# Patient Record
Sex: MALE | Race: BLACK OR AFRICAN AMERICAN | NOT HISPANIC OR LATINO | Employment: UNEMPLOYED | URBAN - METROPOLITAN AREA
[De-identification: names, ages, dates, MRNs, and addresses within clinical notes are randomized per-mention and may not be internally consistent; named-entity substitution may affect disease eponyms.]

---

## 2017-05-04 ENCOUNTER — HOSPITAL ENCOUNTER (EMERGENCY)
Facility: HOSPITAL | Age: 47
Discharge: PSYCHIATRIC OR SUBSTANCE USE FACILITY | End: 2017-05-05
Attending: EMERGENCY MEDICINE

## 2017-05-04 DIAGNOSIS — F29 PSYCHOSIS, UNSPECIFIED PSYCHOSIS TYPE: ICD-10-CM

## 2017-05-04 DIAGNOSIS — R45.851 SUICIDAL IDEATION: Primary | ICD-10-CM

## 2017-05-04 LAB
ALBUMIN SERPL BCP-MCNC: 4.2 G/DL
ALP SERPL-CCNC: 54 U/L
ALT SERPL W/O P-5'-P-CCNC: 39 U/L
ANION GAP SERPL CALC-SCNC: 15 MMOL/L
APAP SERPL-MCNC: <3 UG/ML
AST SERPL-CCNC: 31 U/L
BASOPHILS # BLD AUTO: 0.03 K/UL
BASOPHILS NFR BLD: 0.3 %
BILIRUB SERPL-MCNC: 0.5 MG/DL
BUN SERPL-MCNC: 10 MG/DL
CALCIUM SERPL-MCNC: 10 MG/DL
CHLORIDE SERPL-SCNC: 103 MMOL/L
CO2 SERPL-SCNC: 22 MMOL/L
CREAT SERPL-MCNC: 1.1 MG/DL
DIFFERENTIAL METHOD: ABNORMAL
EOSINOPHIL # BLD AUTO: 0.1 K/UL
EOSINOPHIL NFR BLD: 0.7 %
ERYTHROCYTE [DISTWIDTH] IN BLOOD BY AUTOMATED COUNT: 17.3 %
EST. GFR  (AFRICAN AMERICAN): >60 ML/MIN/1.73 M^2
EST. GFR  (NON AFRICAN AMERICAN): >60 ML/MIN/1.73 M^2
ETHANOL SERPL-MCNC: <10 MG/DL
GLUCOSE SERPL-MCNC: 104 MG/DL
HCT VFR BLD AUTO: 42.2 %
HGB BLD-MCNC: 13.4 G/DL
LYMPHOCYTES # BLD AUTO: 2 K/UL
LYMPHOCYTES NFR BLD: 19.1 %
MCH RBC QN AUTO: 24.1 PG
MCHC RBC AUTO-ENTMCNC: 31.8 %
MCV RBC AUTO: 76 FL
MONOCYTES # BLD AUTO: 0.8 K/UL
MONOCYTES NFR BLD: 7.7 %
NEUTROPHILS # BLD AUTO: 7.4 K/UL
NEUTROPHILS NFR BLD: 72 %
PLATELET # BLD AUTO: 318 K/UL
PMV BLD AUTO: 10.1 FL
POTASSIUM SERPL-SCNC: 3.7 MMOL/L
PROT SERPL-MCNC: 9.4 G/DL
RBC # BLD AUTO: 5.55 M/UL
SODIUM SERPL-SCNC: 140 MMOL/L
T4 FREE SERPL-MCNC: 1.27 NG/DL
TSH SERPL DL<=0.005 MIU/L-ACNC: 4.64 UIU/ML
WBC # BLD AUTO: 10.23 K/UL

## 2017-05-04 PROCEDURE — 93005 ELECTROCARDIOGRAM TRACING: CPT

## 2017-05-04 PROCEDURE — 80320 DRUG SCREEN QUANTALCOHOLS: CPT

## 2017-05-04 PROCEDURE — 81000 URINALYSIS NONAUTO W/SCOPE: CPT

## 2017-05-04 PROCEDURE — 87086 URINE CULTURE/COLONY COUNT: CPT

## 2017-05-04 PROCEDURE — 96372 THER/PROPH/DIAG INJ SC/IM: CPT

## 2017-05-04 PROCEDURE — 80053 COMPREHEN METABOLIC PANEL: CPT

## 2017-05-04 PROCEDURE — 84443 ASSAY THYROID STIM HORMONE: CPT

## 2017-05-04 PROCEDURE — 80329 ANALGESICS NON-OPIOID 1 OR 2: CPT

## 2017-05-04 PROCEDURE — 84439 ASSAY OF FREE THYROXINE: CPT

## 2017-05-04 PROCEDURE — 85025 COMPLETE CBC W/AUTO DIFF WBC: CPT

## 2017-05-04 PROCEDURE — 82570 ASSAY OF URINE CREATININE: CPT

## 2017-05-04 PROCEDURE — 99285 EMERGENCY DEPT VISIT HI MDM: CPT | Mod: 25

## 2017-05-04 PROCEDURE — 63600175 PHARM REV CODE 636 W HCPCS: Performed by: EMERGENCY MEDICINE

## 2017-05-04 RX ORDER — DIPHENHYDRAMINE HYDROCHLORIDE 50 MG/ML
25 INJECTION INTRAMUSCULAR; INTRAVENOUS
Status: COMPLETED | OUTPATIENT
Start: 2017-05-04 | End: 2017-05-04

## 2017-05-04 RX ORDER — ZIPRASIDONE HYDROCHLORIDE 60 MG/1
20 CAPSULE ORAL 2 TIMES DAILY
Status: ON HOLD | COMMUNITY
End: 2018-05-17 | Stop reason: HOSPADM

## 2017-05-04 RX ORDER — LORAZEPAM 2 MG/ML
2 INJECTION INTRAMUSCULAR
Status: COMPLETED | OUTPATIENT
Start: 2017-05-04 | End: 2017-05-04

## 2017-05-04 RX ORDER — HALOPERIDOL 5 MG/ML
10 INJECTION INTRAMUSCULAR
Status: COMPLETED | OUTPATIENT
Start: 2017-05-04 | End: 2017-05-04

## 2017-05-04 RX ADMIN — DIPHENHYDRAMINE HYDROCHLORIDE 25 MG: 50 INJECTION, SOLUTION INTRAMUSCULAR; INTRAVENOUS at 09:05

## 2017-05-04 RX ADMIN — LORAZEPAM 2 MG: 2 INJECTION INTRAMUSCULAR; INTRAVENOUS at 09:05

## 2017-05-04 RX ADMIN — HALOPERIDOL LACTATE 10 MG: 5 INJECTION, SOLUTION INTRAMUSCULAR at 09:05

## 2017-05-05 VITALS
BODY MASS INDEX: 45.1 KG/M2 | SYSTOLIC BLOOD PRESSURE: 128 MMHG | TEMPERATURE: 98 F | OXYGEN SATURATION: 95 % | WEIGHT: 315 LBS | HEIGHT: 70 IN | HEART RATE: 92 BPM | RESPIRATION RATE: 20 BRPM | DIASTOLIC BLOOD PRESSURE: 60 MMHG

## 2017-05-05 LAB
AMPHET+METHAMPHET UR QL: NEGATIVE
BACTERIA #/AREA URNS HPF: ABNORMAL /HPF
BARBITURATES UR QL SCN>200 NG/ML: NEGATIVE
BENZODIAZ UR QL SCN>200 NG/ML: NEGATIVE
BILIRUB UR QL STRIP: NEGATIVE
BZE UR QL SCN: NEGATIVE
CANNABINOIDS UR QL SCN: NEGATIVE
CLARITY UR: CLEAR
COLOR UR: YELLOW
CREAT UR-MCNC: 293 MG/DL
GLUCOSE UR QL STRIP: NEGATIVE
HGB UR QL STRIP: ABNORMAL
HYALINE CASTS #/AREA URNS LPF: 2 /LPF
KETONES UR QL STRIP: ABNORMAL
LEUKOCYTE ESTERASE UR QL STRIP: ABNORMAL
METHADONE UR QL SCN>300 NG/ML: NEGATIVE
MICROSCOPIC COMMENT: ABNORMAL
NITRITE UR QL STRIP: NEGATIVE
OPIATES UR QL SCN: NEGATIVE
PCP UR QL SCN>25 NG/ML: NEGATIVE
PH UR STRIP: 5 [PH] (ref 5–8)
PROT UR QL STRIP: ABNORMAL
RBC #/AREA URNS HPF: 2 /HPF (ref 0–4)
SP GR UR STRIP: 1.02 (ref 1–1.03)
TOXICOLOGY INFORMATION: NORMAL
URN SPEC COLLECT METH UR: ABNORMAL
UROBILINOGEN UR STRIP-ACNC: NEGATIVE EU/DL
WBC #/AREA URNS HPF: 6 /HPF (ref 0–5)

## 2017-05-05 PROCEDURE — 25000003 PHARM REV CODE 250: Performed by: EMERGENCY MEDICINE

## 2017-05-05 RX ORDER — OLANZAPINE 5 MG/1
10 TABLET, ORALLY DISINTEGRATING ORAL DAILY
Status: DISCONTINUED | OUTPATIENT
Start: 2017-05-05 | End: 2017-05-05 | Stop reason: HOSPADM

## 2017-05-05 RX ADMIN — OLANZAPINE 10 MG: 5 TABLET, ORALLY DISINTEGRATING ORAL at 08:05

## 2017-05-05 NOTE — NURSING
PT'S MOTHER AZEEM WILL BE AT WORK ON 5TH FLOOR ORTHO AT [facility] FOR DAY SHIFT IF INFORMATION IS NEEDED.

## 2017-05-05 NOTE — ED NOTES
Spoke with Talisha from [facility]. She states that they are reviewing the chart, will cjheck with Doc and give us a call back.

## 2017-05-05 NOTE — ED NOTES
Called the coroners office at 20:56 no answer but I did leave a message and I faxed over the PEC at 20:31!!!!!!!!!!!!!!!!!!!!!!

## 2017-05-05 NOTE — ED NOTES
SPD here to transport patient to [place]. Security escorted patient out along side [facility].

## 2017-05-05 NOTE — ED TRIAGE NOTES
"Pt states that he came in because "everybody know their parts in your movie. Money power respect. He does not want anybody to get hurt.Pt states that he does not want to hurt anybody or himself. Denies CP, N/V, SOB, depression or anything else. Pts Mother states that she went to coroners office to sign the papers because he wants to hurt himself. His dad picked him up at about 430 this AM and he said that he just wanted to end it. To shoot himself.  "

## 2017-05-05 NOTE — ED PROVIDER NOTES
Encounter Date: 5/4/2017    SCRIBE #1 NOTE: I, Esperanza Gayle, am scribing for, and in the presence of,  Prakash Pulido MD. I have scribed the following portions of the note - Other sections scribed: HPI, ROS.       History     Chief Complaint   Patient presents with    Psychiatric Evaluation     BIB [facility] W/ PROTECTIVE CUSTODY ORDER SECURED BY MOTHER WHO STATES HE IS SUICIDAL. PT DENIES SI, IS CALM AND COOPERATIVE AT THIS TIME.      Review of patient's allergies indicates:  No Known Allergies  HPI Comments: CC: Suicidal Ideation    HPI: 46 y.o. M with medical hx of schizophrenia and bipolar disorder presents to the ED via EMS with protective custody order secured by mother for an evaluation of suicidal ideation and hallucinations since this morning. Pt denies chest pain, N/V, SOB, and any other associated symptoms. Mother states pt went to his father's house this morning and reports wanting to end his life. Pt has no specific plan.    The history is provided by the patient and a parent. No  was used.     Past Medical History:   Diagnosis Date    Brief reactive psychosis      History reviewed. No pertinent surgical history.  History reviewed. No pertinent family history.  Social History   Substance Use Topics    Smoking status: Former Smoker     Types: Cigarettes     Quit date: 7/20/2012    Smokeless tobacco: None    Alcohol use Yes      Comment: once every other month     Review of Systems   Constitutional: Negative for chills, diaphoresis and fever.   HENT: Negative for ear pain and sore throat.    Eyes: Negative for photophobia and visual disturbance.   Respiratory: Negative for cough and shortness of breath.    Cardiovascular: Negative for chest pain.   Gastrointestinal: Negative for abdominal pain, diarrhea, nausea and vomiting.   Genitourinary: Negative for dysuria.   Musculoskeletal: Negative for back pain.   Skin: Negative for rash.   Neurological: Negative for headaches.    Psychiatric/Behavioral: Positive for hallucinations and suicidal ideas.        (-) homicidal ideation       Physical Exam   Initial Vitals   BP Pulse Resp Temp SpO2   -- -- -- -- --            Physical Exam    Nursing note and vitals reviewed.  Constitutional:   Obese, chronically ill-appearing male   HENT:   Head: Normocephalic and atraumatic.   Eyes: EOM are normal. Pupils are equal, round, and reactive to light.   Cardiovascular: Normal rate, regular rhythm, normal heart sounds and intact distal pulses.   Pulmonary/Chest: Breath sounds normal. No respiratory distress. He has no wheezes. He has no rhonchi. He has no rales. He exhibits no tenderness.   Abdominal: Soft. Bowel sounds are normal. He exhibits no distension. There is no tenderness.   Musculoskeletal: Normal range of motion. He exhibits no edema.   Neurological: He is alert and oriented to person, place, and time.   Skin: Skin is warm and dry.   Psychiatric:   Abnormal thought process noted         ED Course   Procedures  Labs Reviewed   CBC W/ AUTO DIFFERENTIAL - Abnormal; Notable for the following:        Result Value    Hemoglobin 13.4 (*)     MCV 76 (*)     MCH 24.1 (*)     MCHC 31.8 (*)     RDW 17.3 (*)     All other components within normal limits   COMPREHENSIVE METABOLIC PANEL - Abnormal; Notable for the following:     CO2 22 (*)     Total Protein 9.4 (*)     Alkaline Phosphatase 54 (*)     All other components within normal limits   TSH - Abnormal; Notable for the following:     TSH 4.640 (*)     All other components within normal limits   URINALYSIS - Abnormal; Notable for the following:     Protein, UA 1+ (*)     Ketones, UA 1+ (*)     Occult Blood UA 1+ (*)     Leukocytes, UA 1+ (*)     All other components within normal limits   ACETAMINOPHEN LEVEL - Abnormal; Notable for the following:     Acetaminophen (Tylenol), Serum <3.0 (*)     All other components within normal limits   URINALYSIS MICROSCOPIC - Abnormal; Notable for the following:      WBC, UA 6 (*)     Hyaline Casts, UA 2 (*)     All other components within normal limits   CULTURE, URINE   DRUG SCREEN PANEL, URINE EMERGENCY   ALCOHOL,MEDICAL (ETHANOL)   T4, FREE        MEDICAL DECISION MAKING    This is an emergent evaluation of the patient's symptoms.  Differential diagnoses includes: Hyponatremia, drug use, bipolar disorder, schizophrenia, psychosis. Room air pulse oximetry interpreted by me as normal. A decision was made to obtain the patient's old medical records.  If they were available, these records were reviewed.  Significant findings include prior evaluation for bronchitis.  The patient appears actively psychotic and is currently gravely disabled with active hallucinations.  He was sent with [facility]. I will emergently place the patient under PEC. I will obtain labs to medically clear the patient and arrange for transfer to inpatient psychiatric facility.                Scribe Attestation:   Scribe #1: I performed the above scribed service and the documentation accurately describes the services I performed. I attest to the accuracy of the note.    Attending Attestation:           Physician Attestation for Scribe:  Physician Attestation Statement for Scribe #1: I, Prakash Pulido MD, reviewed documentation, as scribed by Esperanza Gayle in my presence, and it is both accurate and complete.                 ED Course     Clinical Impression:   The primary encounter diagnosis was Suicidal ideation. A diagnosis of Psychosis, unspecified psychosis type was also pertinent to this visit.          Prakash Pulido MD  05/05/17 0035

## 2017-05-07 LAB — BACTERIA UR CULT: NORMAL

## 2018-05-08 ENCOUNTER — HOSPITAL ENCOUNTER (INPATIENT)
Facility: HOSPITAL | Age: 48
LOS: 9 days | Discharge: HOME | DRG: 885 | End: 2018-05-17
Attending: PSYCHIATRY & NEUROLOGY | Admitting: PSYCHIATRY & NEUROLOGY
Payer: MEDICAID

## 2018-05-08 DIAGNOSIS — F20.9 SCHIZOPHRENIA, CHRONIC CONDITION: Primary | Chronic | ICD-10-CM

## 2018-05-08 DIAGNOSIS — F20.9 SCHIZOPHRENIA, CHRONIC CONDITION WITH ACUTE EXACERBATION: Chronic | ICD-10-CM

## 2018-05-08 DIAGNOSIS — E66.01 CLASS 3 SEVERE OBESITY DUE TO EXCESS CALORIES WITH SERIOUS COMORBIDITY AND BODY MASS INDEX (BMI) OF 40.0 TO 44.9 IN ADULT: ICD-10-CM

## 2018-05-08 DIAGNOSIS — I10 ESSENTIAL HYPERTENSION: Chronic | ICD-10-CM

## 2018-05-08 DIAGNOSIS — F20.0 SCHIZOPHRENIA, PARANOID: ICD-10-CM

## 2018-05-08 DIAGNOSIS — E11.42 TYPE 2 DIABETES MELLITUS WITH DIABETIC POLYNEUROPATHY, WITHOUT LONG-TERM CURRENT USE OF INSULIN: ICD-10-CM

## 2018-05-08 DIAGNOSIS — N30.00 ACUTE CYSTITIS WITHOUT HEMATURIA: ICD-10-CM

## 2018-05-08 PROBLEM — E66.9 OBESITY: Status: ACTIVE | Noted: 2018-05-08

## 2018-05-08 PROBLEM — E11.49 DIABETES MELLITUS WITH NEUROLOGICAL MANIFESTATIONS: Status: ACTIVE | Noted: 2018-05-08

## 2018-05-08 PROCEDURE — 99222 1ST HOSP IP/OBS MODERATE 55: CPT | Mod: AF,HB,, | Performed by: PSYCHIATRY & NEUROLOGY

## 2018-05-08 PROCEDURE — 12400001 HC PSYCH SEMI-PRIVATE ROOM

## 2018-05-08 RX ORDER — DIPHENHYDRAMINE HCL 50 MG
50 CAPSULE ORAL EVERY 4 HOURS PRN
Status: CANCELLED | OUTPATIENT
Start: 2018-05-08

## 2018-05-08 RX ORDER — LORAZEPAM 1 MG/1
2 TABLET ORAL EVERY 4 HOURS PRN
Status: DISCONTINUED | OUTPATIENT
Start: 2018-05-08 | End: 2018-05-17 | Stop reason: HOSPADM

## 2018-05-08 RX ORDER — HALOPERIDOL 5 MG/ML
5 INJECTION INTRAMUSCULAR EVERY 4 HOURS PRN
Status: DISCONTINUED | OUTPATIENT
Start: 2018-05-08 | End: 2018-05-17 | Stop reason: HOSPADM

## 2018-05-08 RX ORDER — CIPROFLOXACIN 500 MG/1
500 TABLET ORAL EVERY 12 HOURS
Status: DISCONTINUED | OUTPATIENT
Start: 2018-05-08 | End: 2018-05-15

## 2018-05-08 RX ORDER — DIPHENHYDRAMINE HCL 50 MG
50 CAPSULE ORAL EVERY 4 HOURS PRN
Status: DISCONTINUED | OUTPATIENT
Start: 2018-05-08 | End: 2018-05-17 | Stop reason: HOSPADM

## 2018-05-08 RX ORDER — DOCUSATE SODIUM 100 MG/1
100 CAPSULE, LIQUID FILLED ORAL DAILY PRN
Status: DISCONTINUED | OUTPATIENT
Start: 2018-05-08 | End: 2018-05-17 | Stop reason: HOSPADM

## 2018-05-08 RX ORDER — HALOPERIDOL 5 MG/1
5 TABLET ORAL EVERY 4 HOURS PRN
Status: DISCONTINUED | OUTPATIENT
Start: 2018-05-08 | End: 2018-05-17 | Stop reason: HOSPADM

## 2018-05-08 RX ORDER — IBUPROFEN 200 MG
24 TABLET ORAL
Status: CANCELLED | OUTPATIENT
Start: 2018-05-08

## 2018-05-08 RX ORDER — LOPERAMIDE HYDROCHLORIDE 2 MG/1
2 CAPSULE ORAL
Status: DISCONTINUED | OUTPATIENT
Start: 2018-05-08 | End: 2018-05-17 | Stop reason: HOSPADM

## 2018-05-08 RX ORDER — HALOPERIDOL 5 MG/1
5 TABLET ORAL 2 TIMES DAILY
Status: CANCELLED | OUTPATIENT
Start: 2018-05-08

## 2018-05-08 RX ORDER — HALOPERIDOL 5 MG/ML
5 INJECTION INTRAMUSCULAR EVERY 4 HOURS PRN
Status: CANCELLED | OUTPATIENT
Start: 2018-05-08

## 2018-05-08 RX ORDER — GLUCAGON 1 MG
1 KIT INJECTION
Status: CANCELLED | OUTPATIENT
Start: 2018-05-08

## 2018-05-08 RX ORDER — FOLIC ACID 1 MG/1
1 TABLET ORAL DAILY
Status: DISCONTINUED | OUTPATIENT
Start: 2018-05-09 | End: 2018-05-17 | Stop reason: HOSPADM

## 2018-05-08 RX ORDER — IBUPROFEN 200 MG
16 TABLET ORAL
Status: CANCELLED | OUTPATIENT
Start: 2018-05-08

## 2018-05-08 RX ORDER — CALCIUM CARBONATE 200(500)MG
1000 TABLET,CHEWABLE ORAL EVERY 8 HOURS PRN
Status: DISCONTINUED | OUTPATIENT
Start: 2018-05-08 | End: 2018-05-17 | Stop reason: HOSPADM

## 2018-05-08 RX ORDER — DIPHENHYDRAMINE HYDROCHLORIDE 50 MG/ML
50 INJECTION INTRAMUSCULAR; INTRAVENOUS EVERY 4 HOURS PRN
Status: DISCONTINUED | OUTPATIENT
Start: 2018-05-08 | End: 2018-05-17 | Stop reason: HOSPADM

## 2018-05-08 RX ORDER — LORAZEPAM 1 MG/1
2 TABLET ORAL EVERY 4 HOURS PRN
Status: CANCELLED | OUTPATIENT
Start: 2018-05-08

## 2018-05-08 RX ORDER — HYDROXYZINE PAMOATE 50 MG/1
50 CAPSULE ORAL NIGHTLY PRN
Status: DISCONTINUED | OUTPATIENT
Start: 2018-05-08 | End: 2018-05-09

## 2018-05-08 RX ORDER — DIPHENHYDRAMINE HYDROCHLORIDE 50 MG/ML
50 INJECTION INTRAMUSCULAR; INTRAVENOUS EVERY 4 HOURS PRN
Status: CANCELLED | OUTPATIENT
Start: 2018-05-08

## 2018-05-08 RX ORDER — HALOPERIDOL 5 MG/1
5 TABLET ORAL EVERY 4 HOURS PRN
Status: CANCELLED | OUTPATIENT
Start: 2018-05-08

## 2018-05-08 RX ORDER — DOXYCYCLINE HYCLATE 100 MG
100 TABLET ORAL EVERY 12 HOURS
Status: CANCELLED | OUTPATIENT
Start: 2018-05-08 | End: 2018-05-15

## 2018-05-08 RX ORDER — DIPHENHYDRAMINE HCL 50 MG
50 CAPSULE ORAL NIGHTLY
Status: CANCELLED | OUTPATIENT
Start: 2018-05-08

## 2018-05-08 NOTE — ASSESSMENT & PLAN NOTE
"Patient acutely psychotic, with hx of schizophrenia with current and past paranoid delusions and disorganized behavior. Bipolar disorder with psychotic lion also within differential. Further history will need to be obtained to clarify diagnosis.  -maintain PEC and admit involuntarily to psychiatry due to grave disability, and threat to self and others (agitation, voicing SI and vague threats "to take with me" other people).   -for now start haldol 5 mg bid due to improvement with haldol IM given in ED. Obtain collateral to get other med trials.  -benadryl 50 mg qhs for insomnia/prevention of EPS.     "

## 2018-05-08 NOTE — H&P
[facility]  Psychiatry  H&P    Patient Name: Adriano Villar Jr.  MRN: [id number]   Code Status: No Order  Admission Date: 5/8/18  Hospital Length of Stay: 0 days  Expected Discharge Date:   Attending Physician: Adriano Ho MD  Primary Care Provider: Primary Doctor No    Current Legal Status: [facility]    Patient information was obtained from patient, past medical records and ER records.     Subjective:     Principal Problem:Schizophrenia    Chief Complaint: Paranoia, bizarre behavior, suicidal ideation.     HPI:48 yo unemployed man residing with [facility] with past psychiatric hx of schizophrenia and bipolar diagnoses, multiple past psychiatric hospitalizations brought in by [facility] unit for bizarre behavior and paranoid delusions in setting of med noncompliance.     Patient denies psychiatric symptoms. Per family patient has been off psychiatric mediations for the past year. Recently has expressed fear that people are watching him or attempting to poison him. Has not been sleeping. Was seen by a psychiatrist yesterday who prescribed him latuda, which patient has not started yet. Was brought in by family to [facility] ED last night dueto paranoid behavior and not eating. At the time he was not felt to be an acute threat to himself or others and was discharged. Mobile Crisis Was called today after patient was running down [address] naked and was reporting bugs were crawling inside of him. Was uncooperative and agitated in the ED, became calm following intramuscular haldol 5 mg, ativan 2 mg and benadryl 50 mg.     Patient refused to speak to this writer, was lying on stretcher, would open eyes and turn when spoken to but did not respond to any questions.     Past psych hx: past psychiatric hospitalizations, prior diagnoses of bipaolr disorder and schizophrenia. Prior med trials unknown.    Past medical hx: possible diabetes per chart    Meds: noncompliant, medications  "unknown    NKDA    Family hx: unknown    Social hx: lives with parents, other details unknown. Prior documentation of substance use. However no positive urine toxicology screens.      Hospital Course: No notes on file    Psychiatric Exam:  Appearance: obese, lying or sitting in hospital stretcher    Behavior: calm but not cooperative, eyes closed but will open them periodically and turn towards writer, glaring  Speech: mutism  Mood: unable to determine  Affect: irritable  Thought Process: unable to assess  Thought Content: per comments to ED staff has paranoid delusions about bugs being inside him, being watched and being poisoned. Has also requested people to kill him and voiced homicidal ideation and intention to "take" other people "with me" when discussing SI.   Association: unknown  Language, memory, level of consciousness, Attention: unable to determine  Insight: impaired  Judgment: impaired    Neurological signs: no involuntary movements or tremor     T4, free (Order [id number])   T4, free   Order: [id number]   Status:  Final result   Visible to patient:  No (Not Released) Next appt:  None         Ref Range & Units 12:05   1yr ago      Free T4 0.71 - 1.51 ng/dL 1.59   1.27    Resulting Agency  [facility]      Specimen Collected: 05/08/18 12:05 Last Resulted: 05/08/18 13:36 Lab Flowsheet Order Details View Encounter Lab and Collection Details                CBC auto differential (Order [id number])   CBC auto differential   Order: [id number]   Status:  Final result   Visible to patient:  No (Not Released) Next appt:  None         Ref Range & Units 12:05   1yr ago   14yr ago      WBC 3.90 - 12.70 K/uL 9.14  10.23  10.23R     RBC 4.60 - 6.20 M/uL 5.89  5.55  5.77     Hemoglobin 14.0 - 18.0 g/dL 14.3  13.4   15.8R     Hematocrit 40.0 - 54.0 % 45.1  42.2  46.7     MCV 82 - 98 fL 77   76   80.9R      MCH 27.0 - 31.0 pg 24.3   24.1   27.4R     MCHC 32.0 - 36.0 g/dL 31.7   31.8R   33.8R     RDW 11.5 - 14.5 % 16.9   17.3   " 14.1     Platelets 150 - 350 K/uL 314  318  291     MPV 9.2 - 12.9 fL 10.0  10.1  10.2     Immature Granulocytes 0.0 - 0.5 % 0.3       Gran # (ANC) 1.8 - 7.7 K/uL 5.7  7.4  7.4R     Immature Grans (Abs) 0.00 - 0.04 K/uL 0.03      Comment: Mild elevation in immature granulocytes is non specific and   can be seen in a variety of conditions including stress response,   acute inflammation, trauma and pregnancy. Correlation with other   laboratory and clinical findings is essential.       Lymph # 1.0 - 4.8 K/uL 2.6  2.0  2.2R     Mono # 0.3 - 1.0 K/uL 0.7  0.8  0.6R     Eos # 0.0 - 0.5 K/uL 0.0  0.1  0.0R     Baso # 0.00 - 0.20 K/uL 0.04  0.03  0.0R     nRBC 0 /100 WBC 0       Gran% 38.0 - 73.0 % 62.2  72.0  72.7R     Lymph% 18.0 - 48.0 % 28.7  19.1  21.0R      Mono% 4.0 - 15.0 % 8.1  7.7  6.0R     Eosinophil% 0.0 - 8.0 % 0.3  0.7  0.2     Basophil% 0.0 - 1.9 % 0.4  0.3  0.1R     Differential Method  Automated  Automated     Resulting Agency  [facility]           Comprehensive metabolic panel   Order: [id number]   Status:  Final result   Visible to patient:  No (Not Released) Next appt:  None         Ref Range & Units 12:05   1yr ago   14yr ago      Sodium 136 - 145 mmol/L 140  140  144R     Potassium 3.5 - 5.1 mmol/L 3.6  3.7  3.7R     Chloride 95 - 110 mmol/L 105  103  104R     CO2 23 - 29 mmol/L 17   22   21R      Glucose 70 - 110 mg/dL 100  104  110R, CM     BUN, Bld 6 - 20 mg/dL 15  10  11R     Creatinine 0.5 - 1.4 mg/dL 1.4  1.1  1.0R     Calcium 8.7 - 10.5 mg/dL 9.9  10.0  10.4R     Total Protein 6.0 - 8.4 g/dL 8.8   9.4   8.9R      Albumin 3.5 - 5.2 g/dL 3.9  4.2  5.7R      Total Bilirubin 0.1 - 1.0 mg/dL 0.6  0.5CM  0.4R    Comment: For infants and newborns, interpretation of results should be based   on gestational age, weight and in agreement with clinical   observations.   Premature Infant recommended reference ranges:   Up to 24 hours.............<8.0 mg/dL   Up to 48 hours............<12.0 mg/dL   3-5  days..................<15.0 mg/dL   6-29 days.................<15.0 mg/dL       Alkaline Phosphatase 55 - 135 U/L 66  54   70     AST 10 - 40 U/L 28  31  12R     ALT 10 - 44 U/L 22  39  13R     Anion Gap 8 - 16 mmol/L 18   15      eGFR if African American >60 mL/min/1.73 m^2 >60.0  >60      eGFR if non African American >60 mL/min/1.73 m^2 >60.0  >60CM     Comment: Calculation used to obtain the estimated glomerular filtration   rate (eGFR) is the CKD-EPI equation.      Resulting Agency  [facility]      Specimen Collected: 05/08/18 12:05 Last Resulted: 05/08/18 12:48 Lab Flowsheet Order Details View Encounter Lab and Collection Details Routing Result History      CM=Additional comments  R=Reference range differs from displayed range              Lab Collection Information   Specimen Type: Blood   Specimen Source: Blood    Collected: 5/8/2018 12:05 PM         Collected By:  [id number]  NINA FERNANDES  [id number]          Encounter   View Encounter        Result Information   Flag: Abnormal Status: Final result (Collected: 5/8/2018 12:05)        TSH   Order: [id number]   Status:  Final result   Visible to patient:  No (Not Released) Next appt:  None         Ref Range & Units 12:05   1yr ago   14yr ago      TSH 0.400 - 4.000 uIU/mL 0.126   4.640   2.5R    Resulting Agency  [facility]      Specimen Collected: 05/08/18 12:05 Last Resulted: 05/08/18 13:04 Lab Flowsheet Order Details View Encounter Lab and Collection Details Routing Result History      R=Reference range differs from displayed range                Vitals 1354 5/8/18  Temp 100  Resp 16  SpO2 100  Bp 135/84      Assessment/Plan:     * Schizophrenia    Patient acutely psychotic, with hx of schizophrenia with current and past paranoid delusions and disorganized behavior. Bipolar disorder with psychotic lion also within differential. Further history will need to be obtained to clarify diagnosis.  -maintain PEC and admit involuntarily to psychiatry  "due to grave disability, and threat to self and others (agitation, voicing SI and vague threats "to take with me" other people).   -for now start haldol 5 mg bid due to improvement with haldol IM given in ED. Obtain collateral to get other med trials.  -benadryl 50 mg qhs for insomnia/prevention of EPS.              Obesity:  Patient morbidly obese, with hx of diabetes per chart but has been noncompliant with medications according to family.  -check hemoglobin a1c and lipid panel fasting.    Need for Continued Hospitalization:   Psychiatric illness continues to pose a potential threat to life or bodily function, of self or others, thereby requiring the need for continued inpatient psychiatric hospitalization.    Anticipated Disposition: Home or Self Care     Ila Church MD   Psychiatry  [facility]  "

## 2018-05-09 PROBLEM — F20.9 SCHIZOPHRENIA, CHRONIC CONDITION WITH ACUTE EXACERBATION: Chronic | Status: ACTIVE | Noted: 2018-05-08

## 2018-05-09 PROBLEM — F20.0 SCHIZOPHRENIA, PARANOID: Status: RESOLVED | Noted: 2018-05-08 | Resolved: 2018-05-09

## 2018-05-09 PROBLEM — I10 ESSENTIAL HYPERTENSION: Chronic | Status: ACTIVE | Noted: 2018-05-08

## 2018-05-09 PROCEDURE — 25000003 PHARM REV CODE 250: Performed by: STUDENT IN AN ORGANIZED HEALTH CARE EDUCATION/TRAINING PROGRAM

## 2018-05-09 PROCEDURE — 12400001 HC PSYCH SEMI-PRIVATE ROOM

## 2018-05-09 PROCEDURE — 99233 SBSQ HOSP IP/OBS HIGH 50: CPT | Mod: AF,HB,, | Performed by: PSYCHIATRY & NEUROLOGY

## 2018-05-09 PROCEDURE — 25000003 PHARM REV CODE 250: Performed by: PSYCHIATRY & NEUROLOGY

## 2018-05-09 RX ORDER — HYDROXYZINE PAMOATE 50 MG/1
50 CAPSULE ORAL EVERY 4 HOURS PRN
Status: DISCONTINUED | OUTPATIENT
Start: 2018-05-09 | End: 2018-05-17 | Stop reason: HOSPADM

## 2018-05-09 RX ORDER — DICYCLOMINE HYDROCHLORIDE 10 MG/1
10 CAPSULE ORAL 4 TIMES DAILY PRN
Status: DISCONTINUED | OUTPATIENT
Start: 2018-05-09 | End: 2018-05-17 | Stop reason: HOSPADM

## 2018-05-09 RX ORDER — LISINOPRIL 10 MG/1
10 TABLET ORAL DAILY
Status: DISCONTINUED | OUTPATIENT
Start: 2018-05-09 | End: 2018-05-17 | Stop reason: HOSPADM

## 2018-05-09 RX ORDER — HYDROXYZINE PAMOATE 50 MG/1
50 CAPSULE ORAL EVERY 6 HOURS PRN
Status: DISCONTINUED | OUTPATIENT
Start: 2018-05-09 | End: 2018-05-09

## 2018-05-09 RX ORDER — RISPERIDONE 1 MG/1
1 TABLET, ORALLY DISINTEGRATING ORAL 2 TIMES DAILY
Status: DISCONTINUED | OUTPATIENT
Start: 2018-05-09 | End: 2018-05-10

## 2018-05-09 RX ORDER — ONDANSETRON 4 MG/1
4 TABLET, FILM COATED ORAL EVERY 6 HOURS PRN
Status: DISCONTINUED | OUTPATIENT
Start: 2018-05-09 | End: 2018-05-17 | Stop reason: HOSPADM

## 2018-05-09 RX ORDER — METFORMIN HYDROCHLORIDE 500 MG/1
500 TABLET ORAL 2 TIMES DAILY WITH MEALS
Status: DISCONTINUED | OUTPATIENT
Start: 2018-05-09 | End: 2018-05-17 | Stop reason: HOSPADM

## 2018-05-09 RX ORDER — AMLODIPINE BESYLATE 10 MG/1
10 TABLET ORAL DAILY
Status: DISCONTINUED | OUTPATIENT
Start: 2018-05-09 | End: 2018-05-17 | Stop reason: HOSPADM

## 2018-05-09 RX ADMIN — DIPHENHYDRAMINE HYDROCHLORIDE 50 MG: 50 CAPSULE ORAL at 08:05

## 2018-05-09 RX ADMIN — HYDROXYZINE PAMOATE 50 MG: 50 CAPSULE ORAL at 02:05

## 2018-05-09 RX ADMIN — RISPERIDONE 1 MG: 1 TABLET, ORALLY DISINTEGRATING ORAL at 08:05

## 2018-05-09 RX ADMIN — FOLIC ACID 1 MG: 1 TABLET ORAL at 09:05

## 2018-05-09 RX ADMIN — LORAZEPAM 2 MG: 1 TABLET ORAL at 08:05

## 2018-05-09 RX ADMIN — CIPROFLOXACIN HYDROCHLORIDE 500 MG: 500 TABLET, FILM COATED ORAL at 08:05

## 2018-05-09 RX ADMIN — CALCIUM CARBONATE (ANTACID) CHEW TAB 500 MG 1000 MG: 500 CHEW TAB at 08:05

## 2018-05-09 RX ADMIN — RISPERIDONE 1 MG: 1 TABLET, ORALLY DISINTEGRATING ORAL at 11:05

## 2018-05-09 RX ADMIN — HALOPERIDOL 5 MG: 5 TABLET ORAL at 08:05

## 2018-05-09 RX ADMIN — AMLODIPINE BESYLATE 10 MG: 10 TABLET ORAL at 11:05

## 2018-05-09 RX ADMIN — THERA TABS 1 TABLET: TAB at 09:05

## 2018-05-09 RX ADMIN — LISINOPRIL 10 MG: 10 TABLET ORAL at 11:05

## 2018-05-09 RX ADMIN — CIPROFLOXACIN HYDROCHLORIDE 500 MG: 500 TABLET, FILM COATED ORAL at 09:05

## 2018-05-09 RX ADMIN — METFORMIN HYDROCHLORIDE 500 MG: 500 TABLET, FILM COATED ORAL at 04:05

## 2018-05-09 NOTE — HPI
Principal Problem:Schizophrenia     Chief Complaint: Paranoia, bizarre behavior, suicidal ideation.      HPI: 46 yo unemployed man residing with parents with past psychiatric hx of schizophrenia and bipolar diagnoses, multiple past psychiatric hospitalizations brought in by [facility] unit for bizarre behavior and paranoid delusions in setting of med noncompliance.      Patient denies psychiatric symptoms. Per family patient has been off psychiatric mediations for the past year. Recently has expressed fear that people are watching him or attempting to poison him. Has not been sleeping. Was seen by a psychiatrist yesterday who prescribed him latuda, which patient has not started yet. Was brought in by family to [facility] ED last night dueto paranoid behavior and not eating. At the time he was not felt to be an acute threat to himself or others and was discharged. Mobile Crisis Was called today after patient was running down [address] naked and was reporting bugs were crawling inside of him. Was uncooperative and agitated in the ED, became calm following intramuscular haldol 5 mg, ativan 2 mg and benadryl 50 mg.      Patient refused to speak to this writer, was lying on stretcher, would open eyes and turn when spoken to but did not respond to any questions.      Past psych hx: past psychiatric hospitalizations, prior diagnoses of bipolar disorder and schizophrenia. Prior med trials unknown.     Past medical hx: possible diabetes per chart     Meds: noncompliant, medications unknown     NKDA     Family hx: unknown     Social hx: lives with parents, other details unknown. Prior documentation of substance use. However no positive urine toxicology screens.     [place], Mother, Judy Villar, [phone]  Mother reports that patient has not taken other psychiatric medications other than Latuda, to her knowledge.  Patient does not like taking Latuda due to a side effects of drowsiness.  She denies history  of adverse reactions to medications.  Patient has never tried a RAPHAEL but mother believes it would be a good idea to improve functional status outside the hospital.

## 2018-05-09 NOTE — PSYCH
The patient was escorted onto the via wheelchair and security at approximately 2159. The patient did not have any personal belongings with him from the ED. Upon presentation the patient was lethargic and required repeated prompting to perform tasks and answer questions. Patient searched for contraband, none noted. He refused to sign his admit paper work and answer the majority of questions from staff. Patient oriented to the unit and his room. He is sleeping at this time. Will continue to monitor.

## 2018-05-09 NOTE — PROGRESS NOTES
05/09/18 0900 05/09/18 1000 05/09/18 1100   [facility]   Group Name Community Reintegration Mental Awareness Education   Specific Interventions Current Events Cognitive Stimulation Training Guided Imagery/Relaxation   Participation Quality Refused;Withdrawn Refused;Withdrawn Refused;Withdrawn   Affect/Mood Display Blunted;Flat  (paranoid) Bizarre;Blunted  (paranoid) Bizarre;Blunted  (paraniod )       05/09/18 1200   [facility]   Group Name Therapeutic Recreation   Specific Interventions Skilled Activity Crafts   Participation Quality Refused;Withdrawn   Affect/Mood Display Blunted;Flat  (paranoid)

## 2018-05-09 NOTE — ASSESSMENT & PLAN NOTE
Restarted home metformin 500 mg PO BID  HbA1c and lipid panel pending  Low dose sliding scale insulin

## 2018-05-09 NOTE — ASSESSMENT & PLAN NOTE
"Patient acutely psychotic, with hx of schizophrenia with current and past paranoid delusions and disorganized behavior. Bipolar disorder with psychotic lion also within differential. Further history will need to be obtained to clarify diagnosis.  -maintain PEC due to grave disability, and threat to self and others (agitation, voicing SI and vague threats "to take with me" other people).   -Risperdal 1 mg PO BID started 5/9 with plan for future RAPHAEL  -Haldol , Ativa, Benadryl available PRN for agitation due to psychosis      "

## 2018-05-09 NOTE — HOSPITAL COURSE
"5/9/2018 : Patient was selectively mute, but later answered some questions when he was confronted about his ability to speak.  Very paranoid, repeatedly stating "I didn't do anything".  Reports noncompliance with home medications.  Agreeable to medications and labwork during this hospitalization.  Denies SI/HI/AVH but demonstrates thought blocking and paucity of ideas.  Patient states "I'm a grown man, not a baby, and I wanted to get something to drink" in reference to walking around naked near the highway.     05/10/2018: patient remains paranoid.  He required prn haldol/ativan/benadryl last night for agitation.  He is complying with oral meds.  Will continue risperdal titration.    5/11/2018 : Patient able to answer questions appropriately with the treatment team.  States he felt that he was hearing voices and watching too much TV was responsible.  Powder found in pants pocket did not belong to the patient.  Patient no longer feeling like he is being injected, something he reported as happening at home.  Visited by mother overnight and describes it as going well. Tolerating medications well.  No longer requiring POC glucose.       05/12/2018: This morning pt reports that he is feeling okay. He feels better on Risperdal. He endorses paranoia and IOR when not on medications, but these are improved on Risperdal. He agrees to Invega Sustenna, will order first loading dose for tomorrow. Denies SI/HI/AVH currently. Denies any side effects from his meds.     5/13/2018: overnight, vitals stable, med adehrent, no prns in 24h. Reports feeling "good" and better than when he came in. States sleep was "fair," that his roommate was noisy and he  leave the room so he ended up sleeping in the day room. Stated that he was scared of the medicine at first due to worrying about having to get a shot, but feels less anxious now. Eating ok, no GI upset. Denies other concerns.    5/14/2018 : Patient reports feeling sleepy today.  Denies " "side effects from medications.  Describes mood as fairly stable.  Denies SI/HI/AVH.  Patient reports a burning in his chest that he calls heart burn.  Took Tums last night.  Pain has since resolved.  Counseled to avoid eating before bed.  Feels that Invega shot is working.  Patient plans to return to home with step daughter at time of discharge.      5/15/2018 : Patient denies SI/HI/AVH and paranoia today.  He is calm and cooperative.  Explains that he has financial stress that led to this hospitalization.  Patient eating and sleeping well, tolerating medications.  Patient is also forthcoming that he was not taking his psychiatric medications.  "I felt I didn't really need it because I was keeping myself occupied".  Does report previous paranoid thoughts.  Plans to resume employment and continue medications after hospital discharge.  Patient complains of chronic back pain that is treated by gabapentin.     5/16/2018 : Patient received second dose of Invega Sustenna IM today and PO Risperdal was discontinued.  Patient describes his current mood as "okay".  Denies new problems.  Slept well overnight.  Patient feels the medication is "working pretty good".  Denies visual or auditory hallucinations.  Patient looking forward to "picking up his life" when he returns home.  He will need to reschedule his appointment with his dentist, psychiatrist, and PCP.      5/17/2018 : Patient feeling well today, and feels ready for discharge.  States his thoughts are clear and he is eager to return home and start organizing his things.  Denies current SI/HI/AVH.  He does remember the thoughts of having bugs crawling under his skin, but that is less concerning now.  He has met with someone from [facility], but may also follow at [facility].       "

## 2018-05-09 NOTE — PLAN OF CARE
"Problem: Spiritual Distress, Risk/Actual (Adult,Obstetrics,Pediatric)  Intervention: Facilitate Personal Exploration/Expression of Spirituality  Provided pastoral support per patient request. Offered reflective listening and prayer. Pt expressed reliance on "Ronald" for support. Pt thankful for opportunity to share and for prayer.  remains available to continue to follow as needed.         "

## 2018-05-09 NOTE — SUBJECTIVE & OBJECTIVE
Interval History:     PMHx  Past Medical History Reviewed    ROS  General ROS: positive for  - malaise and nausea      EXAMINATION    VITALS   Vitals:    05/08/18 2159   BP: 136/75   Pulse: 90   Resp: 15   Temp: 99 °F (37.2 °C)       CONSTITUTIONAL  General Appearance: minimally cooperative, obese, drowsy    MUSCULOSKELETAL  Muscle Strength and Tone: grossly normal  Abnormal Involuntary Movements: grossly normal  Gait and Station: slow, wobbly    PSYCHIATRIC   Level of Consciousness: drowsy  Orientation: situation, person  Grooming: poor  Psychomotor Behavior: retardation  Speech: selectively mute, slow rate and low volume, hesitant to respond  Language: fluent english  Mood: irritable, anxious  Affect: flat  Thought Process: paucity of ideas, thought blocking  Associations: appropriate  Thought Content: denies SI/HI/AVH, + paranoia  Memory: recent impaired  Attention: easily distracted  Fund of Knowledge: below average  Insight: impaired  Judgment: impaired       Family History     None        Social History Main Topics    Smoking status: Former Smoker     Types: Cigarettes     Quit date: 7/20/2012    Smokeless tobacco: Not on file    Alcohol use Yes      Comment: once every other month    Drug use: No    Sexual activity: Not on file     Psychotherapeutics     Start     Stop Route Frequency Ordered    05/09/18 1145  risperiDONE disintegrating tablet 1 mg      -- Oral 2 times daily 05/09/18 1039    05/08/18 2349  LORazepam tablet 2 mg      -- Oral Every 4 hours PRN 05/08/18 2251    05/08/18 2348  haloperidol tablet 5 mg      -- Oral Every 4 hours PRN 05/08/18 2251    05/08/18 2344  haloperidol lactate injection 5 mg  (Med - Acute  Behavioral Management)      -- IM Every 4 hours PRN 05/08/18 2250    05/08/18 2344  lorazepam (ATIVAN) injection 2 mg  (Med - Acute  Behavioral Management)      -- IM Every 4 hours PRN 05/08/18 2250           Review of Systems  Objective:     Vital Signs (Most Recent):  Temp: 99 °F  "(37.2 °C) (05/08/18 2159)  Pulse: 90 (05/08/18 2159)  Resp: 15 (05/08/18 2159)  BP: 136/75 (05/08/18 2159) Vital Signs (24h Range):  Temp:  [98.1 °F (36.7 °C)-100 °F (37.8 °C)] 99 °F (37.2 °C)  Pulse:  [] 90  Resp:  [15-20] 15  SpO2:  [96 %-100 %] 96 %  BP: (127-146)/(75-85) 136/75     Height: 5' 11" (180.3 cm)  Weight: (!) 171.7 kg (378 lb 8.5 oz)  Body mass index is 52.79 kg/m².    No intake or output data in the 24 hours ending 05/09/18 1045    Physical Exam     Significant Labs:   Last 24 Hours:   Recent Lab Results       05/08/18  1620 05/08/18  1607 05/08/18  1205      Benzodiazepines Negative       Methadone metabolites Negative       Phencyclidine Negative       Immature Granulocytes   0.3     Immature Grans (Abs)   0.03  Comment:  Mild elevation in immature granulocytes is non specific and   can be seen in a variety of conditions including stress response,   acute inflammation, trauma and pregnancy. Correlation with other   laboratory and clinical findings is essential.       Acetaminophen (Tylenol), Serum   <3.0  Comment:  Toxic Levels:  Adults (4 hr post-ingestion).........>150 ug/mL  Adults (12 hr post-ingestion)........>40 ug/mL  Peds (2 hr post-ingestion, liquid)...>225 ug/mL  (L)     Albumin   3.9     Alcohol, Medical, Serum   <10     Alkaline Phosphatase   66     ALT   22     Amphetamine Screen, Ur Negative       Anion Gap   18(H)     Appearance, UA  Clear      AST   28     Bacteria, UA  Occasional      Barbiturate Screen, Ur Negative       Baso #   0.04     Basophil%   0.4     Bilirubin (UA)  Negative      Total Bilirubin   0.6  Comment:  For infants and newborns, interpretation of results should be based  on gestational age, weight and in agreement with clinical  observations.  Premature Infant recommended reference ranges:  Up to 24 hours.............<8.0 mg/dL  Up to 48 hours............<12.0 mg/dL  3-5 days..................<15.0 mg/dL  6-29 days.................<15.0 mg/dL       BUN, Bld  "  15     Calcium   9.9     Chloride   105     CO2   17(L)     Cocaine (Metab.) Negative       Color, UA  Yellow      Creatinine   1.4     Creatinine, Random Ur 325.0  Comment:  The random urine reference ranges provided were established   for 24 hour urine collections.  No reference ranges exist for  random urine specimens.  Correlate clinically.         Differential Method   Automated     eGFR if    >60.0     eGFR if non    >60.0  Comment:  Calculation used to obtain the estimated glomerular filtration  rate (eGFR) is the CKD-EPI equation.        Eos #   0.0     Eosinophil%   0.3     Free T4   1.59(H)     Glucose   100     Glucose, UA  Negative      Gran # (ANC)   5.7     Gran%   62.2     Hematocrit   45.1     Hemoglobin   14.3     Hyaline Casts, UA  9(A)      Ketones, UA  1+(A)      Leukocytes, UA  2+(A)      Lymph #   2.6     Lymph%   28.7     MCH   24.3(L)     MCHC   31.7(L)     MCV   77(L)     Microscopic Comment  SEE COMMENT  Comment:  Other formed elements not mentioned in the report are not   present in the microscopic examination.         Mono #   0.7     Mono%   8.1     MPV   10.0     Nitrite, UA  Negative      nRBC   0     Occult Blood UA  1+(A)      Opiate Scrn, Ur Negative       pH, UA  5.0      Platelets   314     Potassium   3.6     Total Protein   8.8(H)     Protein, UA  Negative  Comment:  Recommend a 24 hour urine protein or a urine   protein/creatinine ratio if globulin induced proteinuria is  clinically suspected.        RBC   5.89     RBC, UA  11(H)      RDW   16.9(H)     Sodium   140     Specific Gravity, UA  1.020      Specimen UA  Urine, Clean Catch      Squam Epithel, UA  1      Marijuana (THC) Metabolite Negative       Toxicology Information SEE COMMENT  Comment:  This screen includes the following classes of drugs at the   listed cut-off:  Benzodiazepines                  200 ng/ml  Methadone                        300 ng/ml  Cocaine metabolite                300 ng/ml  Opiates                          300 ng/ml  Barbiturates                     200 ng/ml  Amphetamines                    1000 ng/ml  Marijuana metabs (THC)            50 ng/ml  Phencyclidine (PCP)               25 ng/ml  High concentrations of Diphenhydramine may cross-react with  Phencyclidine PCP screening immunoassay giving a false   positive result.  High concentrations of Methylenedioxymethamphetamine (MDMA aka  Ectasy) and other structurally similar compounds may cross-   react with the Amphetamine/Methamphetamine screening   immunoassay giving a false positive result.  A metabolite of the anti-HIV drug Sustiva () may cause  false positive results in the Marijuana metabolite (THC)   screening assay.  Note: This exception list includes only more common   interferants in toxicology screen testing.  Because of many   cross-reactantspositive results on toxicology drug screens   should be confirmed whenever results do not correlate with   clinical presentation.  This report is intended for use in clinical monitoring and  management of patients. It is not intended for use in   employment related drug testing.  Because of any cross-reactants, positive results on toxicology  drug screens should be confirmed whenever results do not  correlate with clinical presentation.  Presumptive positive results are unconfirmed and may be used   only for medical purposes.         TSH   0.126(L)     Urobilinogen, UA  2.0-3.0(A)      WBC, UA  18(H)      WBC   9.14           Significant Imaging: None

## 2018-05-10 LAB — POCT GLUCOSE: 91 MG/DL (ref 70–110)

## 2018-05-10 PROCEDURE — 99233 SBSQ HOSP IP/OBS HIGH 50: CPT | Mod: AF,HB,, | Performed by: PSYCHIATRY & NEUROLOGY

## 2018-05-10 PROCEDURE — 25000003 PHARM REV CODE 250: Performed by: PSYCHIATRY & NEUROLOGY

## 2018-05-10 PROCEDURE — 25000003 PHARM REV CODE 250: Performed by: STUDENT IN AN ORGANIZED HEALTH CARE EDUCATION/TRAINING PROGRAM

## 2018-05-10 PROCEDURE — 12400001 HC PSYCH SEMI-PRIVATE ROOM

## 2018-05-10 RX ORDER — RISPERIDONE 1 MG/1
2 TABLET, ORALLY DISINTEGRATING ORAL 2 TIMES DAILY
Status: DISCONTINUED | OUTPATIENT
Start: 2018-05-10 | End: 2018-05-14

## 2018-05-10 RX ADMIN — CIPROFLOXACIN HYDROCHLORIDE 500 MG: 500 TABLET, FILM COATED ORAL at 08:05

## 2018-05-10 RX ADMIN — LISINOPRIL 10 MG: 10 TABLET ORAL at 08:05

## 2018-05-10 RX ADMIN — FOLIC ACID 1 MG: 1 TABLET ORAL at 08:05

## 2018-05-10 RX ADMIN — METFORMIN HYDROCHLORIDE 500 MG: 500 TABLET, FILM COATED ORAL at 04:05

## 2018-05-10 RX ADMIN — RISPERIDONE 2 MG: 1 TABLET, ORALLY DISINTEGRATING ORAL at 08:05

## 2018-05-10 RX ADMIN — METFORMIN HYDROCHLORIDE 500 MG: 500 TABLET, FILM COATED ORAL at 08:05

## 2018-05-10 RX ADMIN — AMLODIPINE BESYLATE 10 MG: 10 TABLET ORAL at 08:05

## 2018-05-10 RX ADMIN — CALCIUM CARBONATE (ANTACID) CHEW TAB 500 MG 1000 MG: 500 CHEW TAB at 11:05

## 2018-05-10 RX ADMIN — RISPERIDONE 1 MG: 1 TABLET, ORALLY DISINTEGRATING ORAL at 08:05

## 2018-05-10 RX ADMIN — CALCIUM CARBONATE (ANTACID) CHEW TAB 500 MG 1000 MG: 500 CHEW TAB at 03:05

## 2018-05-10 RX ADMIN — THERA TABS 1 TABLET: TAB at 08:05

## 2018-05-10 NOTE — ASSESSMENT & PLAN NOTE
HbA1c and lipid panel pending   Diet and exercise counseled  Need to monitor on atypical neuroleptic

## 2018-05-10 NOTE — ASSESSMENT & PLAN NOTE
cont home metformin 500 mg PO BID  HbA1c and lipid panel pending  Low dose sliding scale insulin   Check POTC glucose bid  Will need to monitor on atypical neuroleptic

## 2018-05-10 NOTE — PROGRESS NOTES
05/09/18 2000   [facility] Group Therapy   Group Name Community Reintegration   Specific Interventions Other (see comments)  (wrap up)   Participation Level None   Participation Quality Refused

## 2018-05-10 NOTE — PSYCH
At approximately 2005 the patient was administered a PO B52 due to increasing agitation and anxiety. Prior to medication administration the patient was noted to be pacing the hallways and refusing to speak to staff. Patient appeared to be thought blocking and becoming agitated. He willingly accepted PO B52. Will continue to monitor.
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
## 2018-05-10 NOTE — ASSESSMENT & PLAN NOTE
continue home medications:  Amlodipine 10 mg PO daily  Lisinopril 10 mg PO daily  Monitor vital signs on unit.
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
## 2018-05-10 NOTE — PROGRESS NOTES
05/10/18 [address] [place] [address] Group Therapy   Group Name Therapeutic Recreation   Participation Level None   Participation Quality Refused;Lack of Interest;Withdrawn   Affect/Mood Display Blunted;Flat  (paranoid)   Patient isolating in room for the most part of day; refused all activities after propmting

## 2018-05-10 NOTE — PSYCH
At approximately 2030, this RN was notified by OC (Elliott) and [facility] (Prieto) that a tan powder substance wrapped in clear plastic was found in this (Adriano Villar's) patient's pants pocket, which were in the washer at that time. Security notified and substance handed off to security. At time of questioning, patient was sedated from prior PO B52 administration.
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
## 2018-05-10 NOTE — ASSESSMENT & PLAN NOTE
"Patient acutely psychotic, with hx of schizophrenia with current and past paranoid delusions and disorganized behavior.      -maintain PEC due to grave disability, and threat to self and others (agitation, voicing SI and vague threats "to take with me" other people).     -Risperdal 1 mg PO BID started 5/9  - increase risperdal to 1mg daily + 2mg bedtime on 5/10 and 2mg bid on 5/11, with plan for future RAPHAEL  -Haldol , Ativa, Benadryl available PRN for agitation due to psychosis      "

## 2018-05-10 NOTE — SUBJECTIVE & OBJECTIVE
Interval History: patient remains paranoid, bizarre behavior.  Last night he was agitated, requiring haldol/ativan/benadryl prn.  He is complying with oral meds.  Staff found white powder in pocket of his clothes last night when they were being washed - he denies recent drug use and utox negative.  When queried he states he feels someone may have been injecting him at home - likely psychotic symptom.  Mother visited last night - still attempting further collateral.  He remains somatically preoccupied.  He asked again about discharge - poor insight into illness.      PMHx  Past Medical History Reviewed    ROS  GI: he reports constipation and queasy stomach  : no dysuria, no increased frequency  Musculoskeletal: denies pain      EXAMINATION    VITALS   Vitals:    05/10/18 0800   BP: (!) 176/72   Pulse: 96   Resp: 17   Temp: 98.2 °F (36.8 °C)       CONSTITUTIONAL  General Appearance: stated age, obese, in hospital garb    MUSCULOSKELETAL  Muscle Strength and Tone: no weakness or spasticity  Abnormal Involuntary Movements: no tremor, no akathisia, no evidence of tardive dyskinesia  Gait and Station: ambulates without assistance, though slowly    PSYCHIATRIC   Level of Consciousness: alert but eyes closed most of interview  Orientation: to person, place, time  Grooming: minimal  Psychomotor Behavior: +pmr  Speech: decreased spontaneity, increased latency at times  Language: speaks english, repeats words/phrases  Mood: upset  Affect: blunted, agitated at times, oddly related  Thought Process: +thought blocking  Associations: intact, no loosening of associations noted  Thought Content: +PI.  Denies SI/HI.    Memory: impaired  Attention: distractible  Fund of Knowledge: intact  Insight: impaired  Judgment: impaired though taking oral meds here      Family History     None        Social History Main Topics    Smoking status: Former Smoker     Types: Cigarettes     Quit date: 7/20/2012    Smokeless tobacco: Not on file  "   Alcohol use Yes      Comment: once every other month    Drug use: No    Sexual activity: Not on file     Psychotherapeutics     Start     Stop Route Frequency Ordered    05/10/18 2100  risperiDONE disintegrating tablet 2 mg      -- Oral 2 times daily 05/10/18 0941    05/08/18 2349  LORazepam tablet 2 mg      -- Oral Every 4 hours PRN 05/08/18 2251 05/08/18 2348  haloperidol tablet 5 mg      -- Oral Every 4 hours PRN 05/08/18 2251 05/08/18 2344  haloperidol lactate injection 5 mg  (Med - Acute  Behavioral Management)      -- IM Every 4 hours PRN 05/08/18 2250 05/08/18 2344  lorazepam (ATIVAN) injection 2 mg  (Med - Acute  Behavioral Management)      -- IM Every 4 hours PRN 05/08/18 2250           Review of Systems  Objective:     Vital Signs (Most Recent):  Temp: 98.2 °F (36.8 °C) (05/10/18 0800)  Pulse: 96 (05/10/18 0800)  Resp: 17 (05/10/18 0800)  BP: (!) 176/72 (05/10/18 0800) Vital Signs (24h Range):  Temp:  [98 °F (36.7 °C)-98.2 °F (36.8 °C)] 98.2 °F (36.8 °C)  Pulse:  [] 96  Resp:  [17-20] 17  BP: (111-176)/(72-89) 176/72     Height: 5' 11" (180.3 cm)  Weight: (!) 171.7 kg (378 lb 8.5 oz)  Body mass index is 52.79 kg/m².    No intake or output data in the 24 hours ending 05/10/18 0957    Physical Exam     Significant Labs:   Last 24 Hours:   Recent Lab Results     None          Significant Imaging: None  "

## 2018-05-11 LAB
CHOLEST SERPL-MCNC: 112 MG/DL
CHOLEST/HDLC SERPL: 4 {RATIO}
ESTIMATED AVG GLUCOSE: 120 MG/DL
HBA1C MFR BLD HPLC: 5.8 %
HDLC SERPL-MCNC: 28 MG/DL
HDLC SERPL: 25 %
LDLC SERPL CALC-MCNC: 68.4 MG/DL
NONHDLC SERPL-MCNC: 84 MG/DL
POCT GLUCOSE: 79 MG/DL (ref 70–110)
TRIGL SERPL-MCNC: 78 MG/DL

## 2018-05-11 PROCEDURE — 12400001 HC PSYCH SEMI-PRIVATE ROOM

## 2018-05-11 PROCEDURE — 90853 GROUP PSYCHOTHERAPY: CPT | Mod: HP,HB,, | Performed by: PSYCHOLOGIST

## 2018-05-11 PROCEDURE — 25000003 PHARM REV CODE 250: Performed by: STUDENT IN AN ORGANIZED HEALTH CARE EDUCATION/TRAINING PROGRAM

## 2018-05-11 PROCEDURE — 25000003 PHARM REV CODE 250: Performed by: PSYCHIATRY & NEUROLOGY

## 2018-05-11 PROCEDURE — 36415 COLL VENOUS BLD VENIPUNCTURE: CPT

## 2018-05-11 PROCEDURE — 99232 SBSQ HOSP IP/OBS MODERATE 35: CPT | Mod: AF,HB,, | Performed by: PSYCHIATRY & NEUROLOGY

## 2018-05-11 PROCEDURE — 80061 LIPID PANEL: CPT

## 2018-05-11 PROCEDURE — 83036 HEMOGLOBIN GLYCOSYLATED A1C: CPT

## 2018-05-11 RX ADMIN — HYDROXYZINE PAMOATE 50 MG: 50 CAPSULE ORAL at 02:05

## 2018-05-11 RX ADMIN — METFORMIN HYDROCHLORIDE 500 MG: 500 TABLET, FILM COATED ORAL at 08:05

## 2018-05-11 RX ADMIN — RISPERIDONE 2 MG: 1 TABLET, ORALLY DISINTEGRATING ORAL at 08:05

## 2018-05-11 RX ADMIN — CIPROFLOXACIN HYDROCHLORIDE 500 MG: 500 TABLET, FILM COATED ORAL at 08:05

## 2018-05-11 RX ADMIN — METFORMIN HYDROCHLORIDE 500 MG: 500 TABLET, FILM COATED ORAL at 06:05

## 2018-05-11 RX ADMIN — AMLODIPINE BESYLATE 10 MG: 10 TABLET ORAL at 08:05

## 2018-05-11 RX ADMIN — FOLIC ACID 1 MG: 1 TABLET ORAL at 08:05

## 2018-05-11 RX ADMIN — THERA TABS 1 TABLET: TAB at 08:05

## 2018-05-11 RX ADMIN — DICYCLOMINE HYDROCHLORIDE 10 MG: 10 CAPSULE ORAL at 02:05

## 2018-05-11 RX ADMIN — LISINOPRIL 10 MG: 10 TABLET ORAL at 08:05

## 2018-05-11 NOTE — SUBJECTIVE & OBJECTIVE
Interval History: see hospital course     PMHx  Past Medical History Reviewed    ROS  General ROS: negative for - fatigue or sleep disturbance      EXAMINATION    VITALS   Vitals:    05/10/18 1930   BP: (!) 153/77   Pulse: 91   Resp: 16   Temp: 99 °F (37.2 °C)       CONSTITUTIONAL  General Appearance: calm, cooperative, casual clothing    MUSCULOSKELETAL  Muscle Strength and Tone: grossly normal  Abnormal Involuntary Movements: grossly normal  Gait and Station: grossly normal    PSYCHIATRIC   Level of Consciousness: awake and alert  Orientation: person, place, situation  Grooming: disheveled  Psychomotor Behavior: appropriate  Speech: normal tone, decreased rate and volume  Language: fluent english  Mood: steady  Affect: blunted  Thought Process: linear and goal oriented  Associations: appropriate  Thought Content: + paranoia, denies SI/HI/AVH  Memory: grossly intact  Attention: grossly intact  Fund of Knowledge: grossly intact  Insight: improving, limited  Judgment: appropriate for situation       Family History     None        Social History Main Topics    Smoking status: Former Smoker     Types: Cigarettes     Quit date: 7/20/2012    Smokeless tobacco: Not on file    Alcohol use Yes      Comment: once every other month    Drug use: No    Sexual activity: Not on file     Psychotherapeutics     Start     Stop Route Frequency Ordered    05/10/18 2100  risperiDONE disintegrating tablet 2 mg      -- Oral 2 times daily 05/10/18 0941    05/08/18 2349  LORazepam tablet 2 mg      -- Oral Every 4 hours PRN 05/08/18 2251    05/08/18 2348  haloperidol tablet 5 mg      -- Oral Every 4 hours PRN 05/08/18 2251    05/08/18 2344  haloperidol lactate injection 5 mg  (Med - Acute  Behavioral Management)      -- IM Every 4 hours PRN 05/08/18 2250 05/08/18 2344  lorazepam (ATIVAN) injection 2 mg  (Med - Acute  Behavioral Management)      -- IM Every 4 hours PRN 05/08/18 2250           Review of Systems  Objective:     Vital  "Signs (Most Recent):  Temp: 99 °F (37.2 °C) (05/10/18 1930)  Pulse: 91 (05/10/18 1930)  Resp: 16 (05/10/18 1930)  BP: (!) 153/77 (05/10/18 1930) Vital Signs (24h Range):  Temp:  [99 °F (37.2 °C)] 99 °F (37.2 °C)  Pulse:  [91] 91  Resp:  [16] 16  BP: (153)/(77) 153/77     Height: 5' 11" (180.3 cm)  Weight: (!) 171.7 kg (378 lb 8.5 oz)  Body mass index is 52.79 kg/m².    No intake or output data in the 24 hours ending 05/11/18 0925    Physical Exam      Significant Labs:   Last 24 Hours:   Recent Lab Results       05/11/18  0727 05/11/18  0611 05/10/18  1630      Cholesterol  112  Comment:  The National Cholesterol Education Program (NCEP) has set the  following guidelines (reference ranges) for Cholesterol:  Optimal.....................<200 mg/dL  Borderline High.............200-239 mg/dL  High........................> or = 240 mg/dL  (L)      Estimated Avg Glucose  120      HDL  28  Comment:  The National Cholesterol Education Program (NCEP) has set the  following guidelines (reference values) for HDL Cholesterol:  Low...............<40 mg/dL  Optimal...........>60 mg/dL  (L)      HDL/Chol Ratio  25.0      Hemoglobin A1C  5.8  Comment:  According to ADA guidelines, hemoglobin A1c <7.0% represents  optimal control in non-pregnant diabetic patients. Different  metrics may apply to specific patient populations.   Standards of Medical Care in Diabetes-2016.  For the purpose of screening for the presence of diabetes:  <5.7%     Consistent with the absence of diabetes  5.7-6.4%  Consistent with increasing risk for diabetes   (prediabetes)  >or=6.5%  Consistent with diabetes  Currently, no consensus exists for use of hemoglobin A1c  for diagnosis of diabetes for children.  This Hemoglobin A1c assay has significant interference with fetal   hemoglobin   (HbF). The results are invalid for patients with abnormal amounts of   HbF,   including those with known Hereditary Persistence   of Fetal Hemoglobin. Heterozygous " hemoglobin variants (HbAS, HbAC,   HbAD, HbAE, HbA2) do not significantly interfere with this assay;   however, presence of multiple variants in a sample may impact the %   interference.  (H)      LDL Cholesterol  68.4  Comment:  The National Cholesterol Education Program (NCEP) has set the  following guidelines (reference values) for LDL Cholesterol:  Optimal.......................<130 mg/dL  Borderline High...............130-159 mg/dL  High..........................160-189 mg/dL  Very High.....................>190 mg/dL        Non-HDL Cholesterol  84  Comment:  Risk category and Non-HDL cholesterol goals:  Coronary heart disease (CHD)or equivalent (10-year risk of CHD >20%):  Non-HDL cholesterol goal     <130 mg/dL  Two or more CHD risk factors and 10-year risk of CHD <= 20%:  Non-HDL cholesterol goal     <160 mg/dL  0 to 1 CHD risk factor:  Non-HDL cholesterol goal     <190 mg/dL        POCT Glucose 79  91     Total Cholesterol/HDL Ratio  4.0      Triglycerides  78  Comment:  The National Cholesterol Education Program (NCEP) has set the  following guidelines (reference values) for triglycerides:  Normal......................<150 mg/dL  Borderline High.............150-199 mg/dL  High........................200-499 mg/dL              Significant Imaging: None

## 2018-05-11 NOTE — PLAN OF CARE
Siobhan Note, Mother, Judy Villar, [phone]    Mother reports that patient has not taken other psychiatric medications other than Latuda, to her knowledge.  Patient does not like taking Latuda due to a side effects of drowsiness.  She denies history of adverse reactions to medications.  Patient has never tried a RAPHAEL but mother believes it would be a good idea to improve functional status outside the hospital.    Yulissa Vaz MD  [facility] PGY2  [phone]

## 2018-05-11 NOTE — PROGRESS NOTES
Group Psychotherapy (PhD/LCSW)    Site: [facility]    Clinical status of patient: Inpatient    Date: 5/11/2018    Group Focus: Life Skills    Length of service: 64390 - 35-40 minutes    Number of patients in attendance: 6    Referred by: Acute Psychiatry Unit Treatment Team    Target symptoms: Psychosis    Patient's response to treatment: Active Listening    Progress toward goals: Progressing slowly    Interval History: Pt attended group and participated briefly in a group discussion of communicating assertively about limits and boundaries. Pt's comments were related to subject but vague and superficial.     Diagnosis: Schizophrenia     Plan: Continue treatment on [facility]

## 2018-05-11 NOTE — PROGRESS NOTES
05/10/18 2000   [facility] Group Therapy   Group Name Community Reintegration   Specific Interventions Relaxation Training   Participation Level Active;Appropriate   Participation Quality Cooperative   Insight/Motivation Improved   Affect/Mood Display Appropriate   Cognition Alert
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
## 2018-05-11 NOTE — PLAN OF CARE
Clarification to note 5/9/18 8:30 pm :  Article of clothing containing tan powder substance wrapped in pants pocket did not belong to this patient Adriano Villar Jr.

## 2018-05-11 NOTE — PLAN OF CARE
Patient alert and oriented. Patient ambulatory with steady gait. Patient calm and cooperative with interactions with peers and staff. Patient denies any auditory or visual hallucinations. Patient also denies any pain or discomfort. Safety maintained. Will continue to monitor.
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
## 2018-05-11 NOTE — ASSESSMENT & PLAN NOTE
cont home metformin 500 mg PO BID  HbA1c and lipid panel within normal limits   Low dose sliding scale insulin available but has not required, [place] glucose bid discontinued   Will need to monitor on atypical neuroleptic
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
## 2018-05-11 NOTE — ASSESSMENT & PLAN NOTE
HbA1c and lipid panel within normal limits   Diet and exercise counseled  Need to monitor on atypical neuroleptic

## 2018-05-11 NOTE — ASSESSMENT & PLAN NOTE
"At time of admission patient acutely psychotic, with hx of schizophrenia with current and past paranoid delusions and disorganized behavior.      -maintain PEC due to grave disability, and threat to self and others (agitation, voicing SI and vague threats "to take with me" other people).     -Risperdal 1 mg PO BID started 5/9  - Increase risperdal to 1mg daily + 2mg bedtime on 5/10 and 2mg bid on 5/11, with plan for future RAPHAEL  -Haldol , Ativa, Benadryl available PRN for agitation due to psychosis      "

## 2018-05-12 PROCEDURE — 25000003 PHARM REV CODE 250: Performed by: PSYCHIATRY & NEUROLOGY

## 2018-05-12 PROCEDURE — 25000003 PHARM REV CODE 250: Performed by: STUDENT IN AN ORGANIZED HEALTH CARE EDUCATION/TRAINING PROGRAM

## 2018-05-12 PROCEDURE — 12400001 HC PSYCH SEMI-PRIVATE ROOM

## 2018-05-12 PROCEDURE — 99232 SBSQ HOSP IP/OBS MODERATE 35: CPT | Mod: AF,HB,, | Performed by: PSYCHIATRY & NEUROLOGY

## 2018-05-12 RX ADMIN — LISINOPRIL 10 MG: 10 TABLET ORAL at 08:05

## 2018-05-12 RX ADMIN — RISPERIDONE 2 MG: 1 TABLET, ORALLY DISINTEGRATING ORAL at 08:05

## 2018-05-12 RX ADMIN — CIPROFLOXACIN HYDROCHLORIDE 500 MG: 500 TABLET, FILM COATED ORAL at 08:05

## 2018-05-12 RX ADMIN — FOLIC ACID 1 MG: 1 TABLET ORAL at 08:05

## 2018-05-12 RX ADMIN — METFORMIN HYDROCHLORIDE 500 MG: 500 TABLET, FILM COATED ORAL at 08:05

## 2018-05-12 RX ADMIN — AMLODIPINE BESYLATE 10 MG: 10 TABLET ORAL at 08:05

## 2018-05-12 RX ADMIN — THERA TABS 1 TABLET: TAB at 08:05

## 2018-05-12 RX ADMIN — METFORMIN HYDROCHLORIDE 500 MG: 500 TABLET, FILM COATED ORAL at 05:05

## 2018-05-12 NOTE — PROGRESS NOTES
[facility]  Psychiatry  Progress Note    Patient Name: Adriano Villar Jr.  MRN: [id number]   Code Status: Full Code  Admission Date: 5/8/2018  Hospital Length of Stay: 4 days  Expected Discharge Date:   Attending Physician: Adriano Ho MD  Primary Care Provider: Primary Doctor No    Current Legal Status: [facility]    Patient information was obtained from patient and past medical records.     Subjective:     Principal Problem:Schizophrenia     Chief Complaint: Paranoia, bizarre behavior, suicidal ideation.      HPI: 48 yo unemployed man residing with parents with past psychiatric hx of schizophrenia and bipolar diagnoses, multiple past psychiatric hospitalizations brought in by [facility] unit for bizarre behavior and paranoid delusions in setting of med noncompliance.      Patient denies psychiatric symptoms. Per family patient has been off psychiatric mediations for the past year. Recently has expressed fear that people are watching him or attempting to poison him. Has not been sleeping. Was seen by a psychiatrist yesterday who prescribed him latuda, which patient has not started yet. Was brought in by family to [facility] ED last night dueto paranoid behavior and not eating. At the time he was not felt to be an acute threat to himself or others and was discharged. Mobile Crisis Was called today after patient was running down [address] naked and was reporting bugs were crawling inside of him. Was uncooperative and agitated in the ED, became calm following intramuscular haldol 5 mg, ativan 2 mg and benadryl 50 mg.      Patient refused to speak to this writer, was lying on stretcher, would open eyes and turn when spoken to but did not respond to any questions.      Past psych hx: past psychiatric hospitalizations, prior diagnoses of bipolar disorder and schizophrenia. Prior med trials unknown.     Past medical hx: possible diabetes per chart     Meds: noncompliant, medications  "unknown     NKDA     Family hx: unknown     Social hx: lives with parents, other details unknown. Prior documentation of substance use. However no positive urine toxicology screens.       Hospital Course: 5/9/2018 : Patient was selectively mute, but later answered some questions when he was confronted about his ability to speak.  Very paranoid, repeatedly stating "I didn't do anything".  Reports noncompliance with home medications.  Agreeable to medications and labwork during this hospitalization.  Denies SI/HI/AVH but demonstrates thought blocking and paucity of ideas.  Patient states "I'm a grown man, not a baby, and I wanted to get something to drink" in reference to walking around naked near the highway.     05/10/2018: patient remains paranoid.  He required prn haldol/ativan/benadryl last night for agitation.  He is complying with oral meds.  Will continue risperdal titration.    5/11/2018 : Patient able to answer questions appropriately with the treatment team.  States he felt that he was hearing voices and watching too much TV was responsible.  Powder found in pants pocket did not belong to the patient.  Patient no longer feeling like he is being injected, something he reported as happening at home.  Visited by mother overnight and describes it as going well. Tolerating medications well.  No longer requiring POC glucose.       05/12/2018: This morning pt reports that he is feeling okay. He feels better on Risperdal. He endorses paranoia and IOR when not on medications, but these are improved on Risperdal. He agrees to Invega Sustenna, will order first loading dose for tomorrow. Denies SI/HI/AVH currently. Denies any side effects from his meds.     Interval History: see hospital course    Family History     None        Social History Main Topics    Smoking status: Former Smoker     Types: Cigarettes     Quit date: 7/20/2012    Smokeless tobacco: Not on file    Alcohol use Yes      Comment: once every other " "month    Drug use: No    Sexual activity: Not on file     Psychotherapeutics     Start     Stop Route Frequency Ordered    05/13/18 1200  paliperidone palmitate injection 234 mg      -- IM Once 05/12/18 1018    05/10/18 2100  risperiDONE disintegrating tablet 2 mg      -- Oral 2 times daily 05/10/18 0941    05/08/18 2349  LORazepam tablet 2 mg      -- Oral Every 4 hours PRN 05/08/18 2251    05/08/18 2348  haloperidol tablet 5 mg      -- Oral Every 4 hours PRN 05/08/18 2251    05/08/18 2344  haloperidol lactate injection 5 mg  (Med - Acute  Behavioral Management)      -- IM Every 4 hours PRN 05/08/18 2250    05/08/18 2344  lorazepam (ATIVAN) injection 2 mg  (Med - Acute  Behavioral Management)      -- IM Every 4 hours PRN 05/08/18 2250           Review of Systems   Psychiatric/Behavioral: Negative for agitation, behavioral problems, dysphoric mood, hallucinations, self-injury and suicidal ideas. The patient is not nervous/anxious.      Objective:     Vital Signs (Most Recent):  Temp: 97.5 °F (36.4 °C) (05/12/18 0800)  Pulse: 88 (05/12/18 0800)  Resp: 18 (05/12/18 0800)  BP: (!) 171/82 (05/12/18 0800) Vital Signs (24h Range):  Temp:  [97.5 °F (36.4 °C)-99.5 °F (37.5 °C)] 97.5 °F (36.4 °C)  Pulse:  [84-88] 88  Resp:  [18] 18  BP: (136-171)/(78-82) 171/82     Height: 5' 11" (180.3 cm)  Weight: (!) 171.7 kg (378 lb 8.5 oz)  Body mass index is 52.79 kg/m².    No intake or output data in the 24 hours ending 05/12/18 1020    Physical Exam   Psychiatric:   Mental Status Exam:  Appearance: no apparent distress, fair hygiene and grooming, casually dressed, obese  Behavior/Cooperation: calm, cooperative  Speech: monotonous, soft  Mood: "okay"  Affect: blunted  Thought Process: goal-directed  Thought Content: denies SI/HI/AVH  Sensorium: alert, awake   Orientation: person, place, situation  Memory: intact  Attention/Concentration: intact  Fund of Knowledge: intact and appropriate to age and level of education   Abstraction: " "intact  Insight: fair  Judgement: intact  Impulse Control: intact  Reliability: fair     Nursing note and vitals reviewed.       Significant Labs:   Last 24 Hours:   Recent Lab Results     None          Significant Imaging: I have reviewed all pertinent imaging results/findings within the past 24 hours.    Assessment/Plan:     * Schizophrenia, chronic condition with acute exacerbation    At time of admission patient acutely psychotic, with hx of schizophrenia with current and past paranoid delusions and disorganized behavior.      -maintain PEC due to grave disability, and threat to self and others (agitation, voicing SI and vague threats "to take with me" other people).     -Risperdal 1 mg PO BID started 5/9. Increased risperdal to 1mg daily + 2mg bedtime on 5/10 and 2mg bid on 5/11.   -Invega Sustenna 234 mg IM ordered for 5/13  -Haldol , Ativa, Benadryl available PRN for agitation due to psychosis            Diabetes mellitus with neurological manifestations    cont home metformin 500 mg PO BID  HbA1c and lipid panel within normal limits   Low dose sliding scale insulin available but has not required, POTC glucose bid discontinued   Will need to monitor on atypical neuroleptic        Essential hypertension    Hypertensive, but reports poor compliance outside of hospital  Restarted home medications:  Amlodipine 10 mg PO daily  Lisinopril 10 mg PO daily  Monitor vital signs on unit        Acute cystitis without hematuria    PO ciprofloxacin per medicine recs, patient reports symptoms improvement         Obesity    HbA1c and lipid panel within normal limits   Diet and exercise counseled  Need to monitor on atypical neuroleptic             Need for Continued Hospitalization:   Psychiatric illness continues to pose a potential threat to life or bodily function, of self or others, thereby requiring the need for continued inpatient psychiatric hospitalization. and Requires ongoing hospitalization for stabilization of " medications.    Anticipated Disposition: Home or Self Care     Total time:  15 with greater than 50% of this time spent in counseling and/or coordination of care.     Case discussed with psychiatry attending: Dr. Albert Encarnacion MD  [facility] Psychiatry, PGY-2  [facility]  05/12/2018 10:22 AM

## 2018-05-12 NOTE — ASSESSMENT & PLAN NOTE
- Acute exacerbation, found in street, naked with paranoid delusions.   - Reportedly not taking medications  - Management per Psych team   - Admitted to psychiatric inpatient [facility]

## 2018-05-12 NOTE — DISCHARGE SUMMARY
"[facility] Hospital Medicine  Discharge Summary      Patient Name: Adriano Villar Jr.  MRN: [id number]  Admission Date: 5/8/2018  Hospital Length of Stay: 4 days  Discharge Date and Time: No discharge date for patient encounter.  Attending Physician: Adriano Ho MD   Discharging Provider: Eugenio Valverde MD  Primary Care Provider: Primary Doctor [facility] Medicine Team: Networked reference to record PCT  Eugenio Valverde MD    HPI:   No notes on file    * No surgery found *      Hospital Course:   5/8 - 5/9 Admitted to IM 3 for concerns of urosepsis and to be medically cleared for transfer to [facility]. PEC'd. Given one time dose of IV rocephin while in ED. However, patient was without any systemic signs of infection and transitioned to PO abx overnight. Without fever overnight. He was transferred to the [facility] on PO cipro in the morning of 5/9.     Consults:     * Schizophrenia, paranoid     Patient acutely psychotic, with hx of schizophrenia with current and past paranoid delusions and disorganized behavior. Bipolar disorder with psychotic lion also within differential. Further history will need to be obtained to clarify diagnosis.     -maintain PEC and admit involuntarily to psychiatry due to grave disability, and threat to self and others (agitation, voicing SI and vague threats "to take with me" other people)  -for now start haldol 5 mg bid due to improvement with haldol IM given in ED. Obtain collateral to get other med trials.  -benadryl 50 mg qhs for insomnia/prevention of EPS.  -medically cleared for admission to [facility] > transferred to [facility]         Acute cystitis without hematuria     UA w/ WBC elevated. However afebrile and without leukocytosis. Will treat for now with rocephin and collect blood cultures.      - Rocephin IVPB in ED  - PO bactrim until cultures result, adjust as needed.   - BCs f/u  - UC f/u          Hypertension     Cannot verify patient's current HTN medications. Held off for now " unless patient's BP is elevated.           Diabetes mellitus with neurological manifestations     Noted to have metformin and gabapentin on med rec. However unable to gather information from patient about current medication list. Will check HbA1c and start low dose sliding scale but glucose is not elevated on admission. Will restart gabapentin if he has issues with peripheral neuropathy.      - HbA1c  - Glucose checks   - LDSS             Obesity     - PT / OT       Final Active Diagnoses:    Diagnosis Date Noted POA    PRINCIPAL PROBLEM:  Schizophrenia, chronic condition with acute exacerbation [F20.9] 05/08/2018 Yes     Chronic    Acute cystitis without hematuria [N30.00] 05/08/2018 Yes    Essential hypertension [I10] 05/08/2018 Yes     Chronic    Obesity [E66.9] 05/08/2018 Yes    Diabetes mellitus with neurological manifestations [E11.49] 05/08/2018 Yes      Problems Resolved During this Admission:    Diagnosis Date Noted Date Resolved POA    Schizophrenia, paranoid [F20.0] 05/08/2018 05/09/2018 Yes       Discharged Condition: good    Disposition:     Follow Up:    Patient Instructions:   No discharge procedures on file.    Significant Diagnostic Studies:     UA 5/08  Urine...  05/08    Color, UA Yellow    pH, UA 5.0    Specific [place], UA 1.020    Appearance, UA Clear    Protein, UA Negat...    Glucose, UA Negat...    Ketones, UA 1+    Occult Blood UA 1+    Nitrite, UA Negat...    Urobilinogen, UA >2.0-3.0    Bilirubin (UA) Negat...    Leukocytes, UA 2+    RBC, UA 11    WBC, UA 18    Bacteria, UA Occas...    Squam Epithel, UA 1    Hyaline Casts, UA 9    Microscopic Comment SEE C...       Pending Diagnostic Studies:     None         Medications:  Transfer Medications (for Discharge Readmit only):   Current Facility-Administered Medications   Medication Dose Route Frequency Provider Last Rate Last Dose    amLODIPine tablet 10 mg  10 mg Oral Daily Yulissa Vaz MD   10 mg at 05/12/18 0833    calcium  carbonate 200 mg calcium (500 mg) chewable tablet 1,000 mg  1,000 mg Oral Q8H PRN Deb Pruitt MD   1,000 mg at 05/10/18 2321    ciprofloxacin HCl tablet 500 mg  500 mg Oral Q12H Luna Zuniga II, SAYDA   500 mg at 05/12/18 0833    dicyclomine capsule 10 mg  10 mg Oral QID PRN Yulissa Vaz MD   10 mg at 05/11/18 0220    haloperidol tablet 5 mg  5 mg Oral Q4H PRN Deb Pruitt MD   5 mg at 05/09/18 2003    And    LORazepam tablet 2 mg  2 mg Oral Q4H PRN Deb Pruitt MD   2 mg at 05/09/18 2003    And    diphenhydrAMINE capsule 50 mg  50 mg Oral Q4H PRN Deb Pruitt MD   50 mg at 05/09/18 2003    haloperidol lactate injection 5 mg  5 mg Intramuscular Q4H PRN Deb Pruitt MD        And    diphenhydrAMINE injection 50 mg  50 mg Intramuscular Q4H PRN Deb Pruitt MD        And    lorazepam (ATIVAN) injection 2 mg  2 mg Intramuscular Q4H PRN Deb Pruitt MD        docusate sodium capsule 100 mg  100 mg Oral Daily PRN Deb Pruitt MD        folic acid tablet 1 mg  1 mg Oral Daily Deb Pruitt MD   1 mg at 05/12/18 0833    hydrOXYzine pamoate capsule 50 mg  50 mg Oral Q4H PRN Yulissa Vaz MD   50 mg at 05/11/18 0220    lisinopril tablet 10 mg  10 mg Oral Daily Yulissa Vaz MD   10 mg at 05/12/18 0833    loperamide capsule 2 mg  2 mg Oral PRN Deb Pruitt MD        metFORMIN tablet 500 mg  500 mg Oral BID WM Yulissa Vaz MD   500 mg at 05/12/18 0833    multivitamin tablet 1 tablet  1 tablet Oral Daily Deb Pruitt MD   1 tablet at 05/12/18 0833    ondansetron tablet 4 mg  4 mg Oral Q6H PRN Yulissa Vaz MD        [START ON 5/13/2018] paliperidone palmitate injection 234 mg  234 mg Intramuscular Once Aracelis Encarnacion MD        risperiDONE disintegrating tablet 2 mg  2 mg Oral BID Adriano Ho MD   2 mg at 05/12/18 0833       Indwelling Lines/Drains at time of discharge:    Lines/Drains/Airways          No matching active lines, drains, or airways          Time spent on the discharge of patient: 45 minutes  Patient was seen and examined on the date of discharge and determined to be suitable for discharge.         Eugenio Valverde MD  Department of Hospital Medicine  [facility]

## 2018-05-12 NOTE — SUBJECTIVE & OBJECTIVE
"Interval History: see hospital course    Family History     None        Social History Main Topics    Smoking status: Former Smoker     Types: Cigarettes     Quit date: 7/20/2012    Smokeless tobacco: Not on file    Alcohol use Yes      Comment: once every other month    Drug use: No    Sexual activity: Not on file     Psychotherapeutics     Start     Stop Route Frequency Ordered    05/13/18 1200  paliperidone palmitate injection 234 mg      -- IM Once 05/12/18 1018    05/10/18 2100  risperiDONE disintegrating tablet 2 mg      -- Oral 2 times daily 05/10/18 0941    05/08/18 2349  LORazepam tablet 2 mg      -- Oral Every 4 hours PRN 05/08/18 2251    05/08/18 2348  haloperidol tablet 5 mg      -- Oral Every 4 hours PRN 05/08/18 2251    05/08/18 2344  haloperidol lactate injection 5 mg  (Med - Acute  Behavioral Management)      -- IM Every 4 hours PRN 05/08/18 2250    05/08/18 2344  lorazepam (ATIVAN) injection 2 mg  (Med - Acute  Behavioral Management)      -- IM Every 4 hours PRN 05/08/18 2250           Review of Systems   Psychiatric/Behavioral: Negative for agitation, behavioral problems, dysphoric mood, hallucinations, self-injury and suicidal ideas. The patient is not nervous/anxious.      Objective:     Vital Signs (Most Recent):  Temp: 97.5 °F (36.4 °C) (05/12/18 0800)  Pulse: 88 (05/12/18 0800)  Resp: 18 (05/12/18 0800)  BP: (!) 171/82 (05/12/18 0800) Vital Signs (24h Range):  Temp:  [97.5 °F (36.4 °C)-99.5 °F (37.5 °C)] 97.5 °F (36.4 °C)  Pulse:  [84-88] 88  Resp:  [18] 18  BP: (136-171)/(78-82) 171/82     Height: 5' 11" (180.3 cm)  Weight: (!) 171.7 kg (378 lb 8.5 oz)  Body mass index is 52.79 kg/m².    No intake or output data in the 24 hours ending 05/12/18 1020    Physical Exam   Psychiatric:   Mental Status Exam:  Appearance: no apparent distress, fair hygiene and grooming, casually dressed, obese  Behavior/Cooperation: calm, cooperative  Speech: monotonous, soft  Mood: "okay"  Affect: " blunted  Thought Process: goal-directed  Thought Content: denies SI/HI/AVH  Sensorium: alert, awake   Orientation: person, place, situation  Memory: intact  Attention/Concentration: intact  Fund of Knowledge: intact and appropriate to age and level of education   Abstraction: intact  Insight: fair  Judgement: intact  Impulse Control: intact  Reliability: fair     Nursing note and vitals reviewed.       Significant Labs:   Last 24 Hours:   Recent Lab Results     None          Significant Imaging: I have reviewed all pertinent imaging results/findings within the past 24 hours.

## 2018-05-12 NOTE — ASSESSMENT & PLAN NOTE
- Admitted to hospital medicine for concerns of urosepsis  - Given IV dose of rocephin in ED   - Now resolved and admitted to psychiatric [facility]

## 2018-05-13 PROCEDURE — 99232 SBSQ HOSP IP/OBS MODERATE 35: CPT | Mod: AF,HB,, | Performed by: PSYCHIATRY & NEUROLOGY

## 2018-05-13 PROCEDURE — 12400001 HC PSYCH SEMI-PRIVATE ROOM

## 2018-05-13 PROCEDURE — 25000003 PHARM REV CODE 250: Performed by: PSYCHIATRY & NEUROLOGY

## 2018-05-13 PROCEDURE — 63600175 PHARM REV CODE 636 W HCPCS: Mod: JG | Performed by: PSYCHIATRY & NEUROLOGY

## 2018-05-13 PROCEDURE — 25000003 PHARM REV CODE 250: Performed by: STUDENT IN AN ORGANIZED HEALTH CARE EDUCATION/TRAINING PROGRAM

## 2018-05-13 RX ADMIN — METFORMIN HYDROCHLORIDE 500 MG: 500 TABLET, FILM COATED ORAL at 04:05

## 2018-05-13 RX ADMIN — RISPERIDONE 2 MG: 1 TABLET, ORALLY DISINTEGRATING ORAL at 09:05

## 2018-05-13 RX ADMIN — AMLODIPINE BESYLATE 10 MG: 10 TABLET ORAL at 09:05

## 2018-05-13 RX ADMIN — PALIPERIDONE PALMITATE 234 MG: 234 INJECTION INTRAMUSCULAR at 12:05

## 2018-05-13 RX ADMIN — THERA TABS 1 TABLET: TAB at 09:05

## 2018-05-13 RX ADMIN — FOLIC ACID 1 MG: 1 TABLET ORAL at 09:05

## 2018-05-13 RX ADMIN — METFORMIN HYDROCHLORIDE 500 MG: 500 TABLET, FILM COATED ORAL at 08:05

## 2018-05-13 RX ADMIN — CIPROFLOXACIN HYDROCHLORIDE 500 MG: 500 TABLET, FILM COATED ORAL at 09:05

## 2018-05-13 RX ADMIN — CALCIUM CARBONATE (ANTACID) CHEW TAB 500 MG 1000 MG: 500 CHEW TAB at 04:05

## 2018-05-13 RX ADMIN — CIPROFLOXACIN HYDROCHLORIDE 500 MG: 500 TABLET, FILM COATED ORAL at 08:05

## 2018-05-13 RX ADMIN — LISINOPRIL 10 MG: 10 TABLET ORAL at 09:05

## 2018-05-13 RX ADMIN — RISPERIDONE 2 MG: 1 TABLET, ORALLY DISINTEGRATING ORAL at 08:05
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
## 2018-05-13 NOTE — PROGRESS NOTES
05/13/18 0900 05/13/18 1000 05/13/18 1100   [facility] Group Therapy   Group Name Community Reintegration Mental Awareness Stress Management   Specific Interventions Current Events Cognitive Stimulation Training Guided Imagery/Relaxation   Participation Level Appropriate Minimal None   Participation Quality Cooperative Lack of Interest Lack of Interest   Insight/Motivation Improved --  --    Affect/Mood Display Appropriate --  --    Cognition Alert;Oriented --  --        05/13/18 1300 05/13/18 1500   [facility] Group Therapy   Group Name Therapeutic Recreation Therapeutic Recreation   Specific Interventions (bean bag toss) Skilled Activity Crafts   Participation Level None None   Participation Quality Lack of Interest Lack of Interest   Insight/Motivation --  --    Affect/Mood Display --  --    Cognition --  --

## 2018-05-13 NOTE — PROGRESS NOTES
[facility]  Psychiatry  Progress Note    Patient Name: Adriano Villar Jr.  MRN: [id number]   Code Status: Full Code  Admission Date: 5/8/2018  Hospital Length of Stay: 5 days  Expected Discharge Date:   Attending Physician: Adriano Ho MD  Primary Care Provider: Primary Doctor No    [place] [facility] [place]    Patient information was obtained from patient and ER records.     Subjective:     Principal Problem:Schizophrenia, chronic condition with acute exacerbation      HPI: Principal Problem:Schizophrenia     Chief Complaint: Paranoia, bizarre behavior, suicidal ideation.      HPI: 46 yo unemployed man residing with parents with past psychiatric hx of schizophrenia and bipolar diagnoses, multiple past psychiatric hospitalizations brought in by [facility] Crisis unit for bizarre behavior and paranoid delusions in setting of med noncompliance.      Patient denies psychiatric symptoms. Per family patient has been off psychiatric mediations for the past year. Recently has expressed fear that people are watching him or attempting to poison him. Has not been sleeping. Was seen by a psychiatrist yesterday who prescribed him latuda, which patient has not started yet. Was brought in by family to [facility] ED last night dueto paranoid behavior and not eating. At the time he was not felt to be an acute threat to himself or others and was discharged. Mobile Crisis Was called today after patient was running down [address] naked and was reporting bugs were crawling inside of him. Was uncooperative and agitated in the ED, became calm following intramuscular haldol 5 mg, ativan 2 mg and benadryl 50 mg.      Patient refused to speak to this writer, was lying on stretcher, would open eyes and turn when spoken to but did not respond to any questions.      Past psych hx: past psychiatric hospitalizations, prior diagnoses of bipolar disorder and schizophrenia. Prior med trials unknown.     Past medical  "hx: possible diabetes per chart     Meds: noncompliant, medications unknown     NKDA     Family hx: unknown     Social hx: lives with parents, other details unknown. Prior documentation of substance use. However no positive urine toxicology screens.       Hospital Course: 5/9/2018 : Patient was selectively mute, but later answered some questions when he was confronted about his ability to speak.  Very paranoid, repeatedly stating "I didn't do anything".  Reports noncompliance with home medications.  Agreeable to medications and labwork during this hospitalization.  Denies SI/HI/AVH but demonstrates thought blocking and paucity of ideas.  Patient states "I'm a grown man, not a baby, and I wanted to get something to drink" in reference to walking around naked near the highway.     05/10/2018: patient remains paranoid.  He required prn haldol/ativan/benadryl last night for agitation.  He is complying with oral meds.  Will continue risperdal titration.    5/11/2018 : Patient able to answer questions appropriately with the treatment team.  States he felt that he was hearing voices and watching too much TV was responsible.  Powder found in pants pocket did not belong to the patient.  Patient no longer feeling like he is being injected, something he reported as happening at home.  Visited by mother overnight and describes it as going well. Tolerating medications well.  No longer requiring POC glucose.       05/12/2018: This morning pt reports that he is feeling okay. He feels better on Risperdal. He endorses paranoia and IOR when not on medications, but these are improved on Risperdal. [facility] will order first loading dose for tomorrow. Denies SI/HI/AVH currently. Denies any side effects from his meds.     5/13/2018: overnight, vitals stable, med adehrent, no prns in 24h. Reports feeling "good" and better than when he came in. States sleep was "fair," that his roommate was noisy and he  leave the room " "so he ended up sleeping in the day room. Stated that he was scared of the medicine at first due to worrying about having to get a shot, but feels less anxious now. Eating ok, no GI upset. Denies other concerns.      Interval History: see hospital course    Family History     None        Social History Main Topics    Smoking status: Former Smoker     Types: Cigarettes     Quit date: 7/20/2012    Smokeless tobacco: Not on file    Alcohol use Yes      Comment: once every other month    Drug use: No    Sexual activity: Not on file     Psychotherapeutics     Start     Stop Route Frequency Ordered    05/13/18 1200  paliperidone palmitate injection 234 mg      -- IM Once 05/12/18 1018    05/10/18 2100  risperiDONE disintegrating tablet 2 mg      -- Oral 2 times daily 05/10/18 0941    05/08/18 2349  LORazepam tablet 2 mg      -- Oral Every 4 hours PRN 05/08/18 2251    05/08/18 2348  haloperidol tablet 5 mg      -- Oral Every 4 hours PRN 05/08/18 2251    05/08/18 2344  haloperidol lactate injection 5 mg  (Med - Acute  Behavioral Management)      -- IM Every 4 hours PRN 05/08/18 2250 05/08/18 2344  lorazepam (ATIVAN) injection 2 mg  (Med - Acute  Behavioral Management)      -- IM Every 4 hours PRN 05/08/18 2250           Review of Systems   Constitutional: see hospital course section  GI: see hospital course section  Objective:     Vital Signs (Most Recent):  Temp: 98.6 °F (37 °C) (05/13/18 0800)  Pulse: 87 (05/13/18 0800)  Resp: 17 (05/13/18 0800)  BP: (!) 147/75 (05/13/18 0800) Vital Signs (24h Range):  Temp:  [98.6 °F (37 °C)-99.4 °F (37.4 °C)] 98.6 °F (37 °C)  Pulse:  [87-93] 87  Resp:  [17-20] 17  BP: (147-149)/(71-75) 147/75     Height: 5' 11" (180.3 cm)  Weight: (!) 171.7 kg (378 lb 8.5 oz)  Body mass index is 52.79 kg/m².    No intake or output data in the 24 hours ending 05/13/18 1020    Physical Exam   Nursing note and vitals reviewed.       Mental Status Exam:  Appearance: no apparent distress, fair hygiene " "and grooming, casually dressed, obese  Behavior/Cooperation: calm, cooperative  Speech: nl rate, tone, volume  Mood: "better"  Affect: more range, brighter  Thought Process: goal-directed  Thought Content: denies SI/HI/AVH, now denies paranoid delusions  Sensorium: alert, awake   Orientation: person, place, situation  Memory: grossly intact to interview  Attention/Concentration: intact to interview  Fund of Knowledge: intact and appropriate to age and level of education   Abstraction: intact  Insight: fair  Judgement: intact  Impulse Control: intact        Significant Labs:   Last 24 Hours:   Recent Lab Results     None          Significant Imaging: I have reviewed all pertinent imaging results/findings within the past 24 hours.    Assessment/Plan:     * Schizophrenia, chronic condition with acute exacerbation    At time of admission patient acutely psychotic, with hx of schizophrenia with current and past paranoid delusions and disorganized behavior.      -maintain PEC due to grave disability, and threat to self and others (agitation, voicing SI and vague threats "to take with me" other people).     -Risperdal 1 mg PO BID started 5/9  - Increase risperdal to 1mg daily + 2mg bedtime on 5/10 and 2mg bid on 5/11, with plan for future RAPHAEL  -Haldol , Ativa, Benadryl available PRN for agitation due to psychosis            Diabetes mellitus with neurological manifestations    cont home metformin 500 mg PO BID  HbA1c and lipid panel within normal limits   Low dose sliding scale insulin available but has not required, [facility]OT[facility] glucose bid discontinued   Will need to monitor on atypical neuroleptic        Essential hypertension    Hypertensive, but reports poor compliance outside of hospital  Restarted home medications:  Amlodipine 10 mg PO daily  Lisinopril 10 mg PO daily  Monitor vital signs on unit        Acute cystitis without hematuria    PO ciprofloxacin per medicine recs, patient reports symptoms improvement       "   Obesity    HbA1c and lipid panel within normal limits   Diet and exercise counseled  Need to monitor on atypical neuroleptic             Need for Continued Hospitalization:   Protective inpatient psychiatric hospitalization required while a safe disposition plan is enacted. and Requires ongoing hospitalization for stabilization of medications.        Darling Keyes MD   Psychiatry  [facility]

## 2018-05-13 NOTE — SUBJECTIVE & OBJECTIVE
"Interval History: see hospital course    Family History     None        Social History Main Topics    Smoking status: Former Smoker     Types: Cigarettes     Quit date: 7/20/2012    Smokeless tobacco: Not on file    Alcohol use Yes      Comment: once every other month    Drug use: No    Sexual activity: Not on file     Psychotherapeutics     Start     Stop Route Frequency Ordered    05/13/18 1200  paliperidone palmitate injection 234 mg      -- IM Once 05/12/18 1018    05/10/18 2100  risperiDONE disintegrating tablet 2 mg      -- Oral 2 times daily 05/10/18 0941    05/08/18 2349  LORazepam tablet 2 mg      -- Oral Every 4 hours PRN 05/08/18 2251    05/08/18 2348  haloperidol tablet 5 mg      -- Oral Every 4 hours PRN 05/08/18 2251 05/08/18 2344  haloperidol lactate injection 5 mg  (Med - Acute  Behavioral Management)      -- IM Every 4 hours PRN 05/08/18 2250 05/08/18 2344  lorazepam (ATIVAN) injection 2 mg  (Med - Acute  Behavioral Management)      -- IM Every 4 hours PRN 05/08/18 2250           Review of Systems   Constitutional: see hospital course section  GI: see hospital course section  Objective:     Vital Signs (Most Recent):  Temp: 98.6 °F (37 °C) (05/13/18 0800)  Pulse: 87 (05/13/18 0800)  Resp: 17 (05/13/18 0800)  BP: (!) 147/75 (05/13/18 0800) Vital Signs (24h Range):  Temp:  [98.6 °F (37 °C)-99.4 °F (37.4 °C)] 98.6 °F (37 °C)  Pulse:  [87-93] 87  Resp:  [17-20] 17  BP: (147-149)/(71-75) 147/75     Height: 5' 11" (180.3 cm)  Weight: (!) 171.7 kg (378 lb 8.5 oz)  Body mass index is 52.79 kg/m².    No intake or output data in the 24 hours ending 05/13/18 1020    Physical Exam   Nursing note and vitals reviewed.       Mental Status Exam:  Appearance: no apparent distress, fair hygiene and grooming, casually dressed, obese  Behavior/Cooperation: calm, cooperative  Speech: nl rate, tone, volume  Mood: "better"  Affect: more range, brighter  Thought Process: goal-directed  Thought Content: denies " SI/HI/AVH, now denies paranoid delusions  Sensorium: alert, awake   Orientation: person, place, situation  Memory: grossly intact to interview  Attention/Concentration: intact to interview  Fund of Knowledge: intact and appropriate to age and level of education   Abstraction: intact  Insight: fair  Judgement: intact  Impulse Control: intact        Significant Labs:   Last 24 Hours:   Recent Lab Results     None          Significant Imaging: I have reviewed all pertinent imaging results/findings within the past 24 hours.

## 2018-05-14 PROCEDURE — 90853 GROUP PSYCHOTHERAPY: CPT | Mod: HP,HB,, | Performed by: PSYCHOLOGIST

## 2018-05-14 PROCEDURE — 25000003 PHARM REV CODE 250: Performed by: STUDENT IN AN ORGANIZED HEALTH CARE EDUCATION/TRAINING PROGRAM

## 2018-05-14 PROCEDURE — 12400001 HC PSYCH SEMI-PRIVATE ROOM

## 2018-05-14 PROCEDURE — 25000003 PHARM REV CODE 250: Performed by: PSYCHIATRY & NEUROLOGY

## 2018-05-14 PROCEDURE — 99232 SBSQ HOSP IP/OBS MODERATE 35: CPT | Mod: AF,HB,, | Performed by: PSYCHIATRY & NEUROLOGY

## 2018-05-14 RX ORDER — MAG HYDROX/ALUMINUM HYD/SIMETH 200-200-20
30 SUSPENSION, ORAL (FINAL DOSE FORM) ORAL
Status: DISCONTINUED | OUTPATIENT
Start: 2018-05-14 | End: 2018-05-17 | Stop reason: HOSPADM

## 2018-05-14 RX ORDER — RISPERIDONE 1 MG/1
1 TABLET, ORALLY DISINTEGRATING ORAL 2 TIMES DAILY
Status: DISCONTINUED | OUTPATIENT
Start: 2018-05-14 | End: 2018-05-16

## 2018-05-14 RX ADMIN — LISINOPRIL 10 MG: 10 TABLET ORAL at 08:05

## 2018-05-14 RX ADMIN — ALUMINUM HYDROXIDE, MAGNESIUM HYDROXIDE, AND SIMETHICONE 30 ML: 200; 200; 20 SUSPENSION ORAL at 08:05

## 2018-05-14 RX ADMIN — THERA TABS 1 TABLET: TAB at 08:05

## 2018-05-14 RX ADMIN — METFORMIN HYDROCHLORIDE 500 MG: 500 TABLET, FILM COATED ORAL at 05:05

## 2018-05-14 RX ADMIN — FOLIC ACID 1 MG: 1 TABLET ORAL at 08:05

## 2018-05-14 RX ADMIN — AMLODIPINE BESYLATE 10 MG: 10 TABLET ORAL at 08:05

## 2018-05-14 RX ADMIN — CIPROFLOXACIN HYDROCHLORIDE 500 MG: 500 TABLET, FILM COATED ORAL at 08:05

## 2018-05-14 RX ADMIN — RISPERIDONE 2 MG: 1 TABLET, ORALLY DISINTEGRATING ORAL at 08:05

## 2018-05-14 RX ADMIN — METFORMIN HYDROCHLORIDE 500 MG: 500 TABLET, FILM COATED ORAL at 08:05

## 2018-05-14 RX ADMIN — RISPERIDONE 1 MG: 1 TABLET, ORALLY DISINTEGRATING ORAL at 08:05

## 2018-05-14 NOTE — ASSESSMENT & PLAN NOTE
Hypertensive, but reports poor compliance outside of hospital  Restarted home medications at time of admission:  Amlodipine 10 mg PO daily  Lisinopril 10 mg PO daily  Monitor vital signs on unit

## 2018-05-14 NOTE — ASSESSMENT & PLAN NOTE
cont home metformin 500 mg PO BID  HbA1c and lipid panel within normal limits   Low dose sliding scale insulin available but has not required, [facility] glucose bid discontinued   Will need to monitor on atypical neuroleptic

## 2018-05-14 NOTE — PLAN OF CARE
Problem: Patient Care Overview (Adult)  Goal: Plan of Care Review  Outcome: Ongoing (interventions implemented as appropriate)  Calm, cooperative. No display of psychotic signs or symptoms during the evening. Currently denies hallucinations. Does not exhibit paranoid behaviors. 6 hours of sleep observed. Medication compliant. Blood glucose controlled with daily medications.

## 2018-05-14 NOTE — PROGRESS NOTES
Group Psychotherapy (PhD/LCSW)    Site: [facility]    Clinical status of patient: Inpatient    Date: 5/14/2018    Group Focus: Life Skills    Length of service: 49806 - 35-40 minutes    Number of patients in attendance: 7    Referred by: Acute Psychiatry Unit Treatment Team    Target symptoms: Psychosis    Patient's response to treatment: Active Listening    Progress toward goals: Progressing slowly    Interval History: Pt appeared alert and attentive in group. Pt interacted briefly but appropriately when prompted in the group  discussion of the way intense feelings (anger, depression, anxiety) can skew perception negatively and create a vicious Sycuan by reinforcing the negative feelings. Pt note that he can depend on his mother for helpful feedback.     Diagnosis: Schizophrenia     Plan: Continue treatment on [facility]

## 2018-05-14 NOTE — SUBJECTIVE & OBJECTIVE
"Interval History: see hospital course.     Family History     None        Social History Main Topics    Smoking status: Former Smoker     Types: Cigarettes     Quit date: 7/20/2012    Smokeless tobacco: Not on file    Alcohol use Yes      Comment: once every other month    Drug use: No    Sexual activity: Not on file     Psychotherapeutics     Start     Stop Route Frequency Ordered    05/10/18 2100  risperiDONE disintegrating tablet 2 mg      -- Oral 2 times daily 05/10/18 0941    05/08/18 2349  LORazepam tablet 2 mg      -- Oral Every 4 hours PRN 05/08/18 2251 05/08/18 2348  haloperidol tablet 5 mg      -- Oral Every 4 hours PRN 05/08/18 2251 05/08/18 2344  haloperidol lactate injection 5 mg  (Med - Acute  Behavioral Management)      -- IM Every 4 hours PRN 05/08/18 2250 05/08/18 2344  lorazepam (ATIVAN) injection 2 mg  (Med - Acute  Behavioral Management)      -- IM Every 4 hours PRN 05/08/18 2250           Review of Systems  Objective:     Vital Signs (Most Recent):  Temp: 98.2 °F (36.8 °C) (05/14/18 0800)  Pulse: 80 (05/14/18 0800)  Resp: 17 (05/14/18 0800)  BP: (!) 142/72 (05/14/18 0800) Vital Signs (24h Range):  Temp:  [98.2 °F (36.8 °C)-99.3 °F (37.4 °C)] 98.2 °F (36.8 °C)  Pulse:  [80-90] 80  Resp:  [16-17] 17  BP: (136-142)/(62-72) 142/72     Height: 5' 11" (180.3 cm)  Weight: (!) 171.7 kg (378 lb 8.5 oz)  Body mass index is 52.79 kg/m².    No intake or output data in the 24 hours ending 05/14/18 0942    Physical Exam      Mental Status Exam:  Appearance: age appropriate, casually dressed, obese  Behavior/Cooperation:  cooperative, eye contact normal  Speech: normal tone, normal rate, normal pitch, normal volume  Language: uses words appropriately; NO aphasia or dysarthria  Mood: steady  Affect:  congruent with mood and appropriate to situation/content   Thought Process: normal and logical  Thought Content: normal, no suicidality, no homicidality, delusions, or paranoia  Level of " Consciousness: Alert and Oriented x3  Memory:  Grossly Intact  Attention/concentration: appropriate for age/education.   Fund of Knowledge: appears adequate  Insight: Limited  Judgment: Intact, appropriate to situation     AIMS Evaluation:  Abnormal Involuntary Movement Scale  0-4   Muscles of Facial Expression  0   Lips and Perioral Area  0   Jaw  0   Tongue  0   Upper (arms, wrists, hands, fingers)  0    Lower (legs, knees, ankles, toes)  0   Neck, shoulders, hips  0   Severity of abnormal movements (highest score from questions above)  0    Incapacitation due to abnormal movements  0    Patient's awareness of abnormal movements (rate only patient's report)  0   Current problems with teeth and/or dentures?  No    Does patient usually wear dentures?  No      Score 0/36    Significant Labs:   Last 24 Hours:   Recent Lab Results     None          Significant Imaging: None

## 2018-05-14 NOTE — PROGRESS NOTES
05/13/18 2000   [facility] Group Therapy   Group Name Stress Management   Specific Interventions Current Events   Participation Level Active;Appropriate   Participation Quality Cooperative;Social   Insight/Motivation Good   Affect/Mood Display Appropriate   Cognition Alert;Oriented

## 2018-05-14 NOTE — ASSESSMENT & PLAN NOTE
"At time of admission patient acutely psychotic, with hx of schizophrenia with current and past paranoid delusions and disorganized behavior.      -maintain PEC due to grave disability, and threat to self and others (agitation, voicing SI and vague threats "to take with me" other people).     -Risperdal 1 mg PO BID started 5/9  - Increase risperdal to 1mg daily + 2mg bedtime on 5/10 and 2mg bid on 5/11.  Decreased to 1 mg PO BID 5/14 after first dose of Invega.     - Received first dose Invega Sustenna 234 mg IM on 5/13  -Haldol , Ativan, Benadryl available PRN for agitation due to psychosis      "

## 2018-05-14 NOTE — PROGRESS NOTES
"[facility]  Psychiatry  Progress Note    Patient Name: Adriano Villar Jr.  MRN: [id number]   Code Status: Full Code  Admission Date: 5/8/2018  Hospital Length of Stay: 6 days  Expected Discharge Date:   Attending Physician: Adriano Ho MD  Primary Care Provider: Primary Doctor No    Current Legal Status: [facility]    Patient information was obtained from patient.     Subjective:     Principal Problem:Schizophrenia, chronic condition with acute exacerbation    Chief Complaint: "I'm feeling better"    HPI: Principal Problem:Schizophrenia     Chief Complaint: Paranoia, bizarre behavior, suicidal ideation.      HPI: 46 yo unemployed man residing with parents with past psychiatric hx of schizophrenia and bipolar diagnoses, multiple past psychiatric hospitalizations brought in by [facility] Crisis unit for bizarre behavior and paranoid delusions in setting of med noncompliance.      Patient denies psychiatric symptoms. Per family patient has been off psychiatric mediations for the past year. Recently has expressed fear that people are watching him or attempting to poison him. Has not been sleeping. Was seen by a psychiatrist yesterday who prescribed him latuda, which patient has not started yet. Was brought in by family to [facility] ED last night dueto paranoid behavior and not eating. At the time he was not felt to be an acute threat to himself or others and was discharged. Mobile Crisis Was called today after patient was running down [address] naked and was reporting bugs were crawling inside of him. Was uncooperative and agitated in the ED, became calm following intramuscular haldol 5 mg, ativan 2 mg and benadryl 50 mg.      Patient refused to speak to this writer, was lying on stretcher, would open eyes and turn when spoken to but did not respond to any questions.      Past psych hx: past psychiatric hospitalizations, prior diagnoses of bipolar disorder and schizophrenia. Prior med trials " "unknown.     Past medical hx: possible diabetes per chart     Meds: noncompliant, medications unknown     NKDA     Family hx: unknown     Social hx: lives with parents, other details unknown. Prior documentation of substance use. However no positive urine toxicology screens.     [place], Mother, Judy Villar, [phone]  Mother reports that patient has not taken other psychiatric medications other than Latuda, to her knowledge.  Patient does not like taking Latuda due to a side effects of drowsiness.  She denies history of adverse reactions to medications.  Patient has never tried a RAPHAEL but mother believes it would be a good idea to improve functional status outside the hospital.         Hospital Course: 5/9/2018 : Patient was selectively mute, but later answered some questions when he was confronted about his ability to speak.  Very paranoid, repeatedly stating "I didn't do anything".  Reports noncompliance with home medications.  Agreeable to medications and labwork during this hospitalization.  Denies SI/HI/AVH but demonstrates thought blocking and paucity of ideas.  Patient states "I'm a grown man, not a baby, and I wanted to get something to drink" in reference to walking around naked near the highway.     05/10/2018: patient remains paranoid.  He required prn haldol/ativan/benadryl last night for agitation.  He is complying with oral meds.  Will continue risperdal titration.    5/11/2018 : Patient able to answer questions appropriately with the treatment team.  States he felt that he was hearing voices and watching too much TV was responsible.  Powder found in pants pocket did not belong to the patient.  Patient no longer feeling like he is being injected, something he reported as happening at home.  Visited by mother overnight and describes it as going well. Tolerating medications well.  No longer requiring POC glucose.       05/12/2018: This morning pt reports that he is feeling okay. He feels better on " "Risperdal. He endorses paranoia and IOR when not on medications, but these are improved on Risperdal. He agrees to Invega Sustenna, will order first loading dose for tomorrow. Denies SI/HI/AVH currently. Denies any side effects from his meds.     5/13/2018: overnight, vitals stable, med adehrent, no prns in 24h. Reports feeling "good" and better than when he came in. States sleep was "fair," that his roommate was noisy and he  leave the room so he ended up sleeping in the day room. Stated that he was scared of the medicine at first due to worrying about having to get a shot, but feels less anxious now. Eating ok, no GI upset. Denies other concerns.    5/14/2018 : Patient reports feeling sleepy today.  Denies side effects from medications.  Describes mood as fairly stable.  Denies SI/HI/AVH.  Patient reports a burning in his chest that he calls heart burn.  Took Tums last night.  Pain has since resolved.  Counseled to avoid eating before bed.  Feels that Invega shot is working.  Patient plans to return to home with step daughter at time of discharge.      Interval History: see hospital course.     Family History     None        Social History Main Topics    Smoking status: Former Smoker     Types: Cigarettes     Quit date: 7/20/2012    Smokeless tobacco: Not on file    Alcohol use Yes      Comment: once every other month    Drug use: No    Sexual activity: Not on file     Psychotherapeutics     Start     Stop Route Frequency Ordered    05/10/18 2100  risperiDONE disintegrating tablet 2 mg      -- Oral 2 times daily 05/10/18 0941    05/08/18 2349  LORazepam tablet 2 mg      -- Oral Every 4 hours PRN 05/08/18 2251 05/08/18 2348  haloperidol tablet 5 mg      -- Oral Every 4 hours PRN 05/08/18 2251 05/08/18 2344  haloperidol lactate injection 5 mg  (Med - Acute  Behavioral Management)      -- IM Every 4 hours PRN 05/08/18 2250 05/08/18 2344  lorazepam (ATIVAN) injection 2 mg  (Med - Acute  Behavioral " "Management)      -- IM Every 4 hours PRN 05/08/18 1040           Review of Systems  Objective:     Vital Signs (Most Recent):  Temp: 98.2 °F (36.8 °C) (05/14/18 0800)  Pulse: 80 (05/14/18 0800)  Resp: 17 (05/14/18 0800)  BP: (!) 142/72 (05/14/18 0800) Vital Signs (24h Range):  Temp:  [98.2 °F (36.8 °C)-99.3 °F (37.4 °C)] 98.2 °F (36.8 °C)  Pulse:  [80-90] 80  Resp:  [16-17] 17  BP: (136-142)/(62-72) 142/72     Height: 5' 11" (180.3 cm)  Weight: (!) 171.7 kg (378 lb 8.5 oz)  Body mass index is 52.79 kg/m².    No intake or output data in the 24 hours ending 05/14/18 0942    Physical Exam      Mental Status Exam:  Appearance: age appropriate, casually dressed, obese  Behavior/Cooperation:  cooperative, eye contact normal  Speech: normal tone, normal rate, normal pitch, normal volume  Language: uses words appropriately; NO aphasia or dysarthria  Mood: steady  Affect:  congruent with mood and appropriate to situation/content   Thought Process: normal and logical  Thought Content: normal, no suicidality, no homicidality, delusions, or paranoia  Level of Consciousness: Alert and Oriented x3  Memory:  Grossly Intact  Attention/concentration: appropriate for age/education.   Fund of Knowledge: appears adequate  Insight: Limited  Judgment: Intact, appropriate to situation     AIMS Evaluation:  Abnormal Involuntary Movement Scale  0-4   Muscles of Facial Expression  0   Lips and Perioral Area  0   Jaw  0   Tongue  0   Upper (arms, wrists, hands, fingers)  0    Lower (legs, knees, ankles, toes)  0   Neck, shoulders, hips  0   Severity of abnormal movements (highest score from questions above)  0    Incapacitation due to abnormal movements  0    Patient's awareness of abnormal movements (rate only patient's report)  0   Current problems with teeth and/or dentures?  No    Does patient usually wear dentures?  No      Score 0/36    Significant Labs:   Last 24 Hours:   Recent Lab Results     None          Significant Imaging: " "None    Assessment/Plan:     * Schizophrenia, chronic condition with acute exacerbation    At time of admission patient acutely psychotic, with hx of schizophrenia with current and past paranoid delusions and disorganized behavior.      -maintain PEC due to grave disability, and threat to self and others (agitation, voicing SI and vague threats "to take with me" other people).     -Risperdal 1 mg PO BID started 5/9  - Increase risperdal to 1mg daily + 2mg bedtime on 5/10 and 2mg bid on 5/11.  Decreased to 1 mg PO BID 5/14 after first dose of Invega.     - Received first dose Invega Sustenna 234 mg IM on 5/13  -Haldol , Ativan, Benadryl available PRN for agitation due to psychosis            Diabetes mellitus with neurological manifestations    cont home metformin 500 mg PO BID  HbA1c and lipid panel within normal limits   Low dose sliding scale insulin available but has not required, POTC glucose bid discontinued   Will need to monitor on atypical neuroleptic        Essential hypertension    Hypertensive, but reports poor compliance outside of hospital  Restarted home medications at time of admission:  Amlodipine 10 mg PO daily  Lisinopril 10 mg PO daily  Monitor vital signs on unit        Acute cystitis without hematuria    PO ciprofloxacin per medicine recs, patient reports symptoms improvement         Obesity    HbA1c and lipid panel within normal limits   Diet and exercise counseled  Need to monitor on atypical neuroleptic             Need for Continued Hospitalization:   Requires ongoing hospitalization for stabilization of medications.    Anticipated Disposition: Home or Self Care     Total time:  15 with greater than 50% of this time spent in counseling and/or coordination of care.       Yulissa Vaz MD   Psychiatry  [facility]  "

## 2018-05-14 NOTE — PROGRESS NOTES
05/14/18 0900 05/14/18 1000 05/14/18 1100   [facility] Group Therapy   Group Name Community Reintegration Education Education   Specific Interventions Current Events Relapse Prevention Guided Imagery/Relaxation   Participation Level Active;Appropriate Active Active;Appropriate   Participation Quality Cooperative Cooperative Cooperative   Insight/Motivation Good Good;Improved Improved;Good   Affect/Mood Display Appropriate Appropriate Appropriate   Cognition Alert Alert Alert       05/14/18 1300   [facility] Group Therapy   Group Name Therapeutic Recreation   Specific Interventions Skilled Activity Crafts   Participation Level --    Participation Quality Refused;Sleeping   Insight/Motivation --    Affect/Mood Display --    Cognition --

## 2018-05-15 PROCEDURE — 97150 GROUP THERAPEUTIC PROCEDURES: CPT

## 2018-05-15 PROCEDURE — 12400001 HC PSYCH SEMI-PRIVATE ROOM

## 2018-05-15 PROCEDURE — 25000003 PHARM REV CODE 250: Performed by: PSYCHIATRY & NEUROLOGY

## 2018-05-15 PROCEDURE — 90853 GROUP PSYCHOTHERAPY: CPT | Mod: HP,HB,, | Performed by: PSYCHOLOGIST

## 2018-05-15 PROCEDURE — 25000003 PHARM REV CODE 250: Performed by: STUDENT IN AN ORGANIZED HEALTH CARE EDUCATION/TRAINING PROGRAM

## 2018-05-15 PROCEDURE — 99232 SBSQ HOSP IP/OBS MODERATE 35: CPT | Mod: AF,HB,, | Performed by: PSYCHIATRY & NEUROLOGY

## 2018-05-15 PROCEDURE — 97165 OT EVAL LOW COMPLEX 30 MIN: CPT

## 2018-05-15 RX ADMIN — METFORMIN HYDROCHLORIDE 500 MG: 500 TABLET, FILM COATED ORAL at 08:05

## 2018-05-15 RX ADMIN — METFORMIN HYDROCHLORIDE 500 MG: 500 TABLET, FILM COATED ORAL at 05:05

## 2018-05-15 RX ADMIN — AMLODIPINE BESYLATE 10 MG: 10 TABLET ORAL at 08:05

## 2018-05-15 RX ADMIN — RISPERIDONE 1 MG: 1 TABLET, ORALLY DISINTEGRATING ORAL at 08:05

## 2018-05-15 RX ADMIN — LISINOPRIL 10 MG: 10 TABLET ORAL at 08:05

## 2018-05-15 RX ADMIN — CIPROFLOXACIN HYDROCHLORIDE 500 MG: 500 TABLET, FILM COATED ORAL at 08:05

## 2018-05-15 RX ADMIN — FOLIC ACID 1 MG: 1 TABLET ORAL at 08:05

## 2018-05-15 RX ADMIN — ALUMINUM HYDROXIDE, MAGNESIUM HYDROXIDE, AND SIMETHICONE 30 ML: 200; 200; 20 SUSPENSION ORAL at 08:05

## 2018-05-15 RX ADMIN — THERA TABS 1 TABLET: TAB at 08:05
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
## 2018-05-15 NOTE — PLAN OF CARE
Problem: Patient Care Overview (Adult)  Goal: Plan of Care Review  Outcome: Ongoing (interventions implemented as appropriate)  Calm, cooperative. No display of psychotic signs or symptoms during the evening. Currently denies hallucinations. Does not exhibit paranoid behaviors. 8 hours of sleep observed. Medication compliant. Blood glucose controlled with daily medications.
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
## 2018-05-15 NOTE — PROGRESS NOTES
05/15/18 0900 05/15/18 1100 05/15/18 1400   [facility] Group Therapy   Group Name Community Reintegration Mental Awareness Therapeutic Recreation   Specific Interventions Current Events Cognitive Stimulation Training Skilled Activity Crafts   Participation Level Active;Appropriate;Attentive Active;Appropriate;Attentive --    Participation Quality Cooperative;Social Cooperative;Social Sleeping;Refused   Insight/Motivation Good Good --    Affect/Mood Display Appropriate Appropriate --    Cognition Alert Alert;Oriented --

## 2018-05-15 NOTE — PROGRESS NOTES
05/15/18 0200   [facility] Group Therapy   Group Name Community Reintegration   Specific Interventions Other (see comments)   Participation Level Active   Participation Quality Cooperative   Insight/Motivation Improved   Affect/Mood Display Appropriate   Cognition Alert

## 2018-05-15 NOTE — PROGRESS NOTES
"[facility]  Psychiatry  Progress Note    Patient Name: Adriano Villar Jr.  MRN: [id number]   Code Status: Full Code  Admission Date: 5/8/2018  Hospital Length of Stay: 7 days  Expected Discharge Date:   Attending Physician: Adriano Ho MD  Primary Care Provider: Primary Doctor No    Current Legal Status: [facility]    Patient information was obtained from patient.     Subjective:     Principal Problem:Schizophrenia, chronic condition with acute exacerbation    Chief Complaint: "I'm feeling better"    HPI: Principal Problem:Schizophrenia     Chief Complaint: Paranoia, bizarre behavior, suicidal ideation.      HPI: 46 yo unemployed man residing with parents with past psychiatric hx of schizophrenia and bipolar diagnoses, multiple past psychiatric hospitalizations brought in by Mobile Crisis unit for bizarre behavior and paranoid delusions in setting of med noncompliance.      Patient denies psychiatric symptoms. Per family patient has been off psychiatric mediations for the past year. Recently has expressed fear that people are watching him or attempting to poison him. Has not been sleeping. Was seen by a psychiatrist yesterday who prescribed him latuda, which patient has not started yet. Was brought in by family to [facility] ED last night dueto paranoid behavior and not eating. At the time he was not felt to be an acute threat to himself or others and was discharged. Mobile Crisis Was called today after patient was running down [address] naked and was reporting bugs were crawling inside of him. Was uncooperative and agitated in the ED, became calm following intramuscular haldol 5 mg, ativan 2 mg and benadryl 50 mg.      Patient refused to speak to this writer, was lying on stretcher, would open eyes and turn when spoken to but did not respond to any questions.      Past psych hx: past psychiatric hospitalizations, prior diagnoses of bipolar disorder and schizophrenia. Prior med trials " "unknown.     Past medical hx: possible diabetes per chart     Meds: noncompliant, medications unknown     NKDA     Family hx: unknown     Social hx: lives with parents, other details unknown. Prior documentation of substance use. However no positive urine toxicology screens.     [place], Mother, Judy Villar, [phone]  Mother reports that patient has not taken other psychiatric medications other than Latuda, to her knowledge.  Patient does not like taking Latuda due to a side effects of drowsiness.  She denies history of adverse reactions to medications.  Patient has never tried a RAPHAEL but mother believes it would be a good idea to improve functional status outside the hospital.         Hospital Course: 5/9/2018 : Patient was selectively mute, but later answered some questions when he was confronted about his ability to speak.  Very paranoid, repeatedly stating "I didn't do anything".  Reports noncompliance with home medications.  Agreeable to medications and labwork during this hospitalization.  Denies SI/HI/AVH but demonstrates thought blocking and paucity of ideas.  Patient states "I'm a grown man, not a baby, and I wanted to get something to drink" in reference to walking around naked near the highway.     05/10/2018: patient remains paranoid.  He required prn haldol/ativan/benadryl last night for agitation.  He is complying with oral meds.  Will continue risperdal titration.    5/11/2018 : Patient able to answer questions appropriately with the treatment team.  States he felt that he was hearing voices and watching too much TV was responsible.  Powder found in pants pocket did not belong to the patient.  Patient no longer feeling like he is being injected, something he reported as happening at home.  Visited by mother overnight and describes it as going well. Tolerating medications well.  No longer requiring POC glucose.       05/12/2018: This morning pt reports that he is feeling okay. He feels better on " "Risperdal. He endorses paranoia and IOR when not on medications, but these are improved on Risperdal. He agrees to Invega Sustenna, will order first loading dose for tomorrow. Denies SI/HI/AVH currently. Denies any side effects from his meds.     5/13/2018: overnight, vitals stable, med adehrent, no prns in 24h. Reports feeling "good" and better than when he came in. States sleep was "fair," that his roommate was noisy and he  leave the room so he ended up sleeping in the day room. Stated that he was scared of the medicine at first due to worrying about having to get a shot, but feels less anxious now. Eating ok, no GI upset. Denies other concerns.    5/14/2018 : Patient reports feeling sleepy today.  Denies side effects from medications.  Describes mood as fairly stable.  Denies SI/HI/AVH.  Patient reports a burning in his chest that he calls heart burn.  Took Tums last night.  Pain has since resolved.  Counseled to avoid eating before bed.  Feels that Invega shot is working.  Patient plans to return to home with step daughter at time of discharge.      5/15/2018 : Patient denies SI/HI/AVH and paranoia today.  He is calm and cooperative.  Explains that he has financial stress that led to this hospitalization.  Patient eating and sleeping well, tolerating medications.  Patient is also forthcoming that he was not taking his psychiatric medications.  "I felt I didn't really need it because I was keeping myself occupied".  Does report previous paranoid thoughts.  Plans to resume employment and continue medications after hospital discharge.  Patient complains of chronic back pain that is treated by gabapentin.     Interval History: see hospital course.     Family History     None        Social History Main Topics    Smoking status: Former Smoker     Types: Cigarettes     Quit date: 7/20/2012    Smokeless tobacco: Not on file    Alcohol use Yes      Comment: once every other month    Drug use: No    Sexual activity: " "Not on file     Psychotherapeutics     Start     Stop Route Frequency Ordered    05/16/18 0000  paliperidone palmitate injection 156 mg      -- IM Once 05/15/18 0945    05/14/18 2100  risperiDONE disintegrating tablet 1 mg      -- Oral 2 times daily 05/14/18 0951    05/08/18 2349  LORazepam tablet 2 mg      -- Oral Every 4 hours PRN 05/08/18 2251 05/08/18 2348  haloperidol tablet 5 mg      -- Oral Every 4 hours PRN 05/08/18 2251 05/08/18 2344  haloperidol lactate injection 5 mg  (Med - Acute  Behavioral Management)      -- IM Every 4 hours PRN 05/08/18 2250 05/08/18 2344  lorazepam (ATIVAN) injection 2 mg  (Med - Acute  Behavioral Management)      -- IM Every 4 hours PRN 05/08/18 2250           Review of Systems  Objective:     Vital Signs (Most Recent):  Temp: 99.1 °F (37.3 °C) (05/14/18 1930)  Pulse: 87 (05/14/18 1930)  Resp: 18 (05/14/18 1930)  BP: 138/69 (05/14/18 1930) Vital Signs (24h Range):  Temp:  [99.1 °F (37.3 °C)] 99.1 °F (37.3 °C)  Pulse:  [87] 87  Resp:  [18] 18  BP: (138)/(69) 138/69     Height: 5' 11" (180.3 cm)  Weight: (!) 171.7 kg (378 lb 8.5 oz)  Body mass index is 52.79 kg/m².    No intake or output data in the 24 hours ending 05/15/18 0947    Physical Exam      Mental Status Exam:  Appearance: age appropriate, casually dressed, neatly groomed, obese  Behavior/Cooperation:  friendly and cooperative  Speech: normal tone, normal rate, normal pitch, normal volume  Language: uses words appropriately; NO aphasia or dysarthria  Mood: steady  Affect:  congruent with mood and appropriate to situation/content   Thought Process: normal and logical  Thought Content: normal, no suicidality, no homicidality, delusions, or paranoia  Level of Consciousness: Alert and Oriented x3  Memory:  Grossly Intact   Attention/concentration: appropriate for age/education.   Fund of Knowledge: appears adequate  Insight: Intact  Judgment: Intact    Significant Labs:   Last 24 Hours:   Recent Lab Results     None    " "      Significant Imaging: None    Assessment/Plan:     * Schizophrenia, chronic condition with acute exacerbation    At time of admission patient acutely psychotic, with hx of schizophrenia with current and past paranoid delusions and disorganized behavior.      -maintain PEC due to grave disability, and threat to self and others (agitation, voicing SI and vague threats "to take with me" other people).     -Risperdal 1 mg PO BID started 5/9  - Increase risperdal to 1mg daily + 2mg bedtime on 5/10 and 2mg bid on 5/11.  Decreased to 1 mg PO BID 5/14 after first dose of Invega.     - Received first dose Invega Sustenna 234 mg IM on 5/13, will plan for second dose 5/16  -Haldol , Ativan, Benadryl available PRN for agitation due to psychosis            Diabetes mellitus with neurological manifestations    cont home metformin 500 mg PO BID  HbA1c and lipid panel within normal limits   Low dose sliding scale insulin available but has not required, POTC glucose bid discontinued   Will need to monitor on atypical neuroleptic        Essential hypertension    Hypertensive, but reports poor compliance outside of hospital  Restarted home medications at time of admission:  Amlodipine 10 mg PO daily  Lisinopril 10 mg PO daily  Monitor vital signs on unit        Acute cystitis without hematuria    Completed PO ciprofloxacin per medicine recs, patient reports symptoms improvement         Obesity    HbA1c and lipid panel within normal limits   Diet and exercise counseled  Need to monitor on atypical neuroleptic             Need for Continued Hospitalization:   Protective inpatient psychiatric hospitalization required while a safe disposition plan is enacted.    Anticipated Disposition: Home or Self Care     Total time:  15 with greater than 50% of this time spent in counseling and/or coordination of care.       Yulissa Vaz MD   Psychiatry  [facility]  "

## 2018-05-15 NOTE — PROGRESS NOTES
05/15/18 0900 05/15/18 1100 05/15/18 1400   [facility] Group Therapy   Group Name Community Reintegration Mental Awareness Therapeutic Recreation   Specific Interventions Current Events Cognitive Stimulation Training Skilled Activity Crafts   Participation Level Active;Appropriate;Attentive Active;Appropriate;Attentive Appropriate   Participation Quality Cooperative;Social Cooperative;Social Cooperative;Social   Insight/Motivation Good Good Good   Affect/Mood Display Appropriate Appropriate Appropriate   Cognition Alert Alert;Oriented Alert
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
## 2018-05-15 NOTE — SUBJECTIVE & OBJECTIVE
"Interval History: see hospital course.     Family History     None        Social History Main Topics    Smoking status: Former Smoker     Types: Cigarettes     Quit date: 7/20/2012    Smokeless tobacco: Not on file    Alcohol use Yes      Comment: once every other month    Drug use: No    Sexual activity: Not on file     Psychotherapeutics     Start     Stop Route Frequency Ordered    05/16/18 0000  paliperidone palmitate injection 156 mg      -- IM Once 05/15/18 0945    05/14/18 2100  risperiDONE disintegrating tablet 1 mg      -- Oral 2 times daily 05/14/18 0951    05/08/18 2349  LORazepam tablet 2 mg      -- Oral Every 4 hours PRN 05/08/18 2251    05/08/18 2348  haloperidol tablet 5 mg      -- Oral Every 4 hours PRN 05/08/18 2251 05/08/18 2344  haloperidol lactate injection 5 mg  (Med - Acute  Behavioral Management)      -- IM Every 4 hours PRN 05/08/18 2250 05/08/18 2344  lorazepam (ATIVAN) injection 2 mg  (Med - Acute  Behavioral Management)      -- IM Every 4 hours PRN 05/08/18 2250           Review of Systems  Objective:     Vital Signs (Most Recent):  Temp: 99.1 °F (37.3 °C) (05/14/18 1930)  Pulse: 87 (05/14/18 1930)  Resp: 18 (05/14/18 1930)  BP: 138/69 (05/14/18 1930) Vital Signs (24h Range):  Temp:  [99.1 °F (37.3 °C)] 99.1 °F (37.3 °C)  Pulse:  [87] 87  Resp:  [18] 18  BP: (138)/(69) 138/69     Height: 5' 11" (180.3 cm)  Weight: (!) 171.7 kg (378 lb 8.5 oz)  Body mass index is 52.79 kg/m².    No intake or output data in the 24 hours ending 05/15/18 0947    Physical Exam      Mental Status Exam:  Appearance: age appropriate, casually dressed, neatly groomed, obese  Behavior/Cooperation:  friendly and cooperative  Speech: normal tone, normal rate, normal pitch, normal volume  Language: uses words appropriately; NO aphasia or dysarthria  Mood: steady  Affect:  congruent with mood and appropriate to situation/content   Thought Process: normal and logical  Thought Content: normal, no suicidality, " no homicidality, delusions, or paranoia  Level of Consciousness: Alert and Oriented x3  Memory:  Grossly Intact   Attention/concentration: appropriate for age/education.   Fund of Knowledge: appears adequate  Insight: Intact  Judgment: Intact    Significant Labs:   Last 24 Hours:   Recent Lab Results     None          Significant Imaging: None

## 2018-05-15 NOTE — ASSESSMENT & PLAN NOTE
"At time of admission patient acutely psychotic, with hx of schizophrenia with current and past paranoid delusions and disorganized behavior.      -maintain PEC due to grave disability, and threat to self and others (agitation, voicing SI and vague threats "to take with me" other people).     -Risperdal 1 mg PO BID started 5/9  - Increase risperdal to 1mg daily + 2mg bedtime on 5/10 and 2mg bid on 5/11.  Decreased to 1 mg PO BID 5/14 after first dose of Invega.     - Received first dose Invega Sustenna 234 mg IM on 5/13, will plan for second dose 5/16  -Haldol , Ativan, Benadryl available PRN for agitation due to psychosis      "

## 2018-05-15 NOTE — ASSESSMENT & PLAN NOTE
cont home metformin 500 mg PO BID  HbA1c and lipid panel within normal limits   Low dose sliding scale insulin available but has not required, [facility] glucose bid discontinued   Will need to monitor on atypical neuroleptic

## 2018-05-15 NOTE — PROGRESS NOTES
"Group Psychotherapy (PhD/LCSW)    Site: [facility]    Clinical status of patient: Inpatient    Date: 5/15/2018    Group Focus: Life Skills    Length of service: 10659 - 35-40 minutes    Number of patients in attendance: 5    Referred by: Acute Psychiatry Unit Treatment Team    Target symptoms: Psychosis    Patient's response to treatment:Self-disclosure when prompted.     Progress toward goals: Progressing slowly    Interval History: Pt appeared drowsy in group. He had to be prompted to participate. His participation was limited to saying that he felt he was "back on track" with his medication and he was getting the help he needed on the AP[facility].      Diagnosis: Schizophrenia     Plan: Continue treatment on [facility]        "

## 2018-05-16 PROCEDURE — 63600175 PHARM REV CODE 636 W HCPCS: Mod: JG | Performed by: STUDENT IN AN ORGANIZED HEALTH CARE EDUCATION/TRAINING PROGRAM

## 2018-05-16 PROCEDURE — 25000003 PHARM REV CODE 250: Performed by: PSYCHIATRY & NEUROLOGY

## 2018-05-16 PROCEDURE — 12400001 HC PSYCH SEMI-PRIVATE ROOM

## 2018-05-16 PROCEDURE — 90853 GROUP PSYCHOTHERAPY: CPT | Mod: HP,HB,, | Performed by: PSYCHOLOGIST

## 2018-05-16 PROCEDURE — 99232 SBSQ HOSP IP/OBS MODERATE 35: CPT | Mod: AF,HB,, | Performed by: PSYCHIATRY & NEUROLOGY

## 2018-05-16 RX ADMIN — METFORMIN HYDROCHLORIDE 500 MG: 500 TABLET, FILM COATED ORAL at 05:05

## 2018-05-16 RX ADMIN — PALIPERIDONE PALMITATE 156 MG: 156 INJECTION INTRAMUSCULAR at 08:05

## 2018-05-16 RX ADMIN — LISINOPRIL 10 MG: 10 TABLET ORAL at 08:05

## 2018-05-16 RX ADMIN — AMLODIPINE BESYLATE 10 MG: 10 TABLET ORAL at 08:05

## 2018-05-16 RX ADMIN — METFORMIN HYDROCHLORIDE 500 MG: 500 TABLET, FILM COATED ORAL at 08:05

## 2018-05-16 RX ADMIN — THERA TABS 1 TABLET: TAB at 08:05

## 2018-05-16 RX ADMIN — RISPERIDONE 1 MG: 1 TABLET, ORALLY DISINTEGRATING ORAL at 08:05

## 2018-05-16 RX ADMIN — FOLIC ACID 1 MG: 1 TABLET ORAL at 08:05

## 2018-05-16 NOTE — PROGRESS NOTES
Group Psychotherapy (PhD/LCSW)    Site: [facility]    Clinical status of patient: Inpatient    Date: 5/16/2018    Group Focus: Life Skills    Length of service: 36652 - 35-40 minutes    Number of patients in attendance: 8    Referred by: Acute Psychiatry Unit Treatment Team    Target symptoms: Psychosis    Patient's response to treatment:Self-disclosure when prompted.     Progress toward goals: Progressing slowly    Interval History: Pt engaged briefly bur appropriately when prompted in a discussion of forgiveness of self and others. Pt appeared drowsy in group.     Diagnosis: Schizophrenia     Plan: Continue treatment on [facility]

## 2018-05-16 NOTE — ASSESSMENT & PLAN NOTE
"At time of admission patient acutely psychotic, with hx of schizophrenia with current and past paranoid delusions and disorganized behavior.      -maintain PEC due to grave disability, and threat to self and others (agitation, voicing SI and vague threats "to take with me" other people).     -Risperdal 1 mg PO BID started 5/9  - Increase risperdal to 1mg daily + 2mg bedtime on 5/10 and 2mg bid on 5/11.  Decreased to 1 mg PO BID 5/14 after first dose of Invega and discontinued 5/16.     - Received first dose Invega Sustenna 234 mg IM on 5/13, second dose 5/16  -Haldol , Ativan, Benadryl available PRN for agitation due to psychosis      "

## 2018-05-16 NOTE — PROGRESS NOTES
"[facility]  Psychiatry  Progress Note    Patient Name: Adriano Villar Jr.  MRN: [id number]   Code Status: Full Code  Admission Date: 5/8/2018  Hospital Length of Stay: 8 days  Expected Discharge Date:   Attending Physician: Adriano Ho MD  Primary Care Provider: Primary Doctor No    Current Legal Status: [facility]    Patient information was obtained from patient.     Subjective:     Principal Problem:Schizophrenia, chronic condition with acute exacerbation    Chief Complaint: "I'm feeling better"    HPI: Principal Problem:Schizophrenia     Chief Complaint: Paranoia, bizarre behavior, suicidal ideation.      HPI: 46 yo unemployed man residing with parents with past psychiatric hx of schizophrenia and bipolar diagnoses, multiple past psychiatric hospitalizations brought in by Mobile Crisis unit for bizarre behavior and paranoid delusions in setting of med noncompliance.      Patient denies psychiatric symptoms. Per family patient has been off psychiatric mediations for the past year. Recently has expressed fear that people are watching him or attempting to poison him. Has not been sleeping. Was seen by a psychiatrist yesterday who prescribed him latuda, which patient has not started yet. Was brought in by family to [facility] ED last night dueto paranoid behavior and not eating. At the time he was not felt to be an acute threat to himself or others and was discharged. Mobile Crisis Was called today after patient was running down [address] naked and was reporting bugs were crawling inside of him. Was uncooperative and agitated in the ED, became calm following intramuscular haldol 5 mg, ativan 2 mg and benadryl 50 mg.      Patient refused to speak to this writer, was lying on stretcher, would open eyes and turn when spoken to but did not respond to any questions.      Past psych hx: past psychiatric hospitalizations, prior diagnoses of bipolar disorder and schizophrenia. Prior med trials " "unknown.     Past medical hx: possible diabetes per chart     Meds: noncompliant, medications unknown     NKDA     Family hx: unknown     Social hx: lives with parents, other details unknown. Prior documentation of substance use. However no positive urine toxicology screens.     [place], Mother, Judy Villar, [phone]  Mother reports that patient has not taken other psychiatric medications other than Latuda, to her knowledge.  Patient does not like taking Latuda due to a side effects of drowsiness.  She denies history of adverse reactions to medications.  Patient has never tried a RAPHAEL but mother believes it would be a good idea to improve functional status outside the hospital.         Hospital Course: 5/9/2018 : Patient was selectively mute, but later answered some questions when he was confronted about his ability to speak.  Very paranoid, repeatedly stating "I didn't do anything".  Reports noncompliance with home medications.  Agreeable to medications and labwork during this hospitalization.  Denies SI/HI/AVH but demonstrates thought blocking and paucity of ideas.  Patient states "I'm a grown man, not a baby, and I wanted to get something to drink" in reference to walking around naked near the highway.     05/10/2018: patient remains paranoid.  He required prn haldol/ativan/benadryl last night for agitation.  He is complying with oral meds.  Will continue risperdal titration.    5/11/2018 : Patient able to answer questions appropriately with the treatment team.  States he felt that he was hearing voices and watching too much TV was responsible.  Powder found in pants pocket did not belong to the patient.  Patient no longer feeling like he is being injected, something he reported as happening at home.  Visited by mother overnight and describes it as going well. Tolerating medications well.  No longer requiring POC glucose.       05/12/2018: This morning pt reports that he is feeling okay. He feels better on " "Risperdal. He endorses paranoia and IOR when not on medications, but these are improved on Risperdal. He agrees to Invega Sustenna, will order first loading dose for tomorrow. Denies SI/HI/AVH currently. Denies any side effects from his meds.     5/13/2018: overnight, vitals stable, med adehrent, no prns in 24h. Reports feeling "good" and better than when he came in. States sleep was "fair," that his roommate was noisy and he  leave the room so he ended up sleeping in the day room. Stated that he was scared of the medicine at first due to worrying about having to get a shot, but feels less anxious now. Eating ok, no GI upset. Denies other concerns.    5/14/2018 : Patient reports feeling sleepy today.  Denies side effects from medications.  Describes mood as fairly stable.  Denies SI/HI/AVH.  Patient reports a burning in his chest that he calls heart burn.  Took Tums last night.  Pain has since resolved.  Counseled to avoid eating before bed.  Feels that Invega shot is working.  Patient plans to return to home with step daughter at time of discharge.      5/15/2018 : Patient denies SI/HI/AVH and paranoia today.  He is calm and cooperative.  Explains that he has financial stress that led to this hospitalization.  Patient eating and sleeping well, tolerating medications.  Patient is also forthcoming that he was not taking his psychiatric medications.  "I felt I didn't really need it because I was keeping myself occupied".  Does report previous paranoid thoughts.  Plans to resume employment and continue medications after hospital discharge.  Patient complains of chronic back pain that is treated by gabapentin.     5/16/2018 : Patient received second dose of Invega Sustenna IM today and PO Risperdal was discontinued.  Patient describes his current mood as "okay".  Denies new problems.  Slept well overnight.  Patient feels the medication is "working pretty good".  Denies visual or auditory hallucinations.  Patient looking " "forward to "picking up his life" when he returns home.  He will need to reschedule his appointment with his dentist, psychiatrist, and PCP.      Interval History: see hospital course.     Family History     None        Social History Main Topics    Smoking status: Former Smoker     Types: Cigarettes     Quit date: 7/20/2012    Smokeless tobacco: Not on file    Alcohol use Yes      Comment: once every other month    Drug use: No    Sexual activity: Not on file     Psychotherapeutics     Start     Stop Route Frequency Ordered    05/08/18 2349  LORazepam tablet 2 mg      -- Oral Every 4 hours PRN 05/08/18 2251 05/08/18 2348  haloperidol tablet 5 mg      -- Oral Every 4 hours PRN 05/08/18 2251 05/08/18 2344  haloperidol lactate injection 5 mg  (Med - Acute  Behavioral Management)      -- IM Every 4 hours PRN 05/08/18 2250 05/08/18 2344  lorazepam (ATIVAN) injection 2 mg  (Med - Acute  Behavioral Management)      -- IM Every 4 hours PRN 05/08/18 2250           Review of Systems  Objective:     Vital Signs (Most Recent):  Temp: 98.8 °F (37.1 °C) (05/16/18 0800)  Pulse: 83 (05/16/18 0800)  Resp: 20 (05/16/18 0800)  BP: 139/82 (05/16/18 0800) Vital Signs (24h Range):  Temp:  [98.8 °F (37.1 °C)] 98.8 °F (37.1 °C)  Pulse:  [80-83] 83  Resp:  [16-20] 20  BP: (135-139)/(65-82) 139/82     Height: 5' 11" (180.3 cm)  Weight: (!) 171.7 kg (378 lb 8.5 oz)  Body mass index is 52.79 kg/m².    No intake or output data in the 24 hours ending 05/16/18 0903    Physical Exam        Mental Status Exam:  Appearance: age appropriate, casually dressed, obese  Behavior/Cooperation:  friendly and cooperative  Speech: normal tone, normal rate, normal pitch, normal volume  Language: uses words appropriately; NO aphasia or dysarthria  Mood: steady  Affect:  congruent with mood and appropriate to situation/content   Thought Process: normal and logical  Thought Content: normal, no suicidality, no homicidality, delusions, or " "paranoia  Level of Consciousness: Alert and Oriented x3  Memory:  Intact  Attention/concentration: appropriate for age/education.   Fund of Knowledge: appears adequate  Insight: Intact  Judgment: Intact    Significant Labs:   Last 24 Hours:   Recent Lab Results     None          Significant Imaging: None    Assessment/Plan:     * Schizophrenia, chronic condition with acute exacerbation    At time of admission patient acutely psychotic, with hx of schizophrenia with current and past paranoid delusions and disorganized behavior.      -maintain PEC due to grave disability, and threat to self and others (agitation, voicing SI and vague threats "to take with me" other people).     -Risperdal 1 mg PO BID started 5/9  - Increase risperdal to 1mg daily + 2mg bedtime on 5/10 and 2mg bid on 5/11.  Decreased to 1 mg PO BID 5/14 after first dose of Invega and discontinued 5/16.     - Received first dose Invega Sustenna 234 mg IM on 5/13, second dose 5/16  -Haldol , Ativan, Benadryl available PRN for agitation due to psychosis            Diabetes mellitus with neurological manifestations    cont home metformin 500 mg PO BID  HbA1c and lipid panel within normal limits   Low dose sliding scale insulin available but has not required, [facility] glucose bid discontinued   Will need to monitor on atypical neuroleptic        Essential hypertension    Hypertensive, but reports poor compliance outside of hospital  Restarted home medications at time of admission:  Amlodipine 10 mg PO daily  Lisinopril 10 mg PO daily  Monitor vital signs on unit        Acute cystitis without hematuria    Completed PO ciprofloxacin per medicine recs, patient reports symptoms improvement         Obesity    HbA1c and lipid panel within normal limits   Diet and exercise counseled  Need to monitor on atypical neuroleptic             Need for Continued Hospitalization:   Protective inpatient psychiatric hospitalization required while a safe disposition plan is " enacted.    Anticipated Disposition: Home or Self Care     Total time:  15 with greater than 50% of this time spent in counseling and/or coordination of care.       Yulissa Vaz MD   Psychiatry  [facility]

## 2018-05-16 NOTE — SUBJECTIVE & OBJECTIVE
"Interval History: see hospital course.     Family History     None        Social History Main Topics    Smoking status: Former Smoker     Types: Cigarettes     Quit date: 7/20/2012    Smokeless tobacco: Not on file    Alcohol use Yes      Comment: once every other month    Drug use: No    Sexual activity: Not on file     Psychotherapeutics     Start     Stop Route Frequency Ordered    05/08/18 2349  LORazepam tablet 2 mg      -- Oral Every 4 hours PRN 05/08/18 2251    05/08/18 2348  haloperidol tablet 5 mg      -- Oral Every 4 hours PRN 05/08/18 2251 05/08/18 2344  haloperidol lactate injection 5 mg  (Med - Acute  Behavioral Management)      -- IM Every 4 hours PRN 05/08/18 2250 05/08/18 2344  lorazepam (ATIVAN) injection 2 mg  (Med - Acute  Behavioral Management)      -- IM Every 4 hours PRN 05/08/18 2250           Review of Systems  Objective:     Vital Signs (Most Recent):  Temp: 98.8 °F (37.1 °C) (05/16/18 0800)  Pulse: 83 (05/16/18 0800)  Resp: 20 (05/16/18 0800)  BP: 139/82 (05/16/18 0800) Vital Signs (24h Range):  Temp:  [98.8 °F (37.1 °C)] 98.8 °F (37.1 °C)  Pulse:  [80-83] 83  Resp:  [16-20] 20  BP: (135-139)/(65-82) 139/82     Height: 5' 11" (180.3 cm)  Weight: (!) 171.7 kg (378 lb 8.5 oz)  Body mass index is 52.79 kg/m².    No intake or output data in the 24 hours ending 05/16/18 0903    Physical Exam        Mental Status Exam:  Appearance: age appropriate, casually dressed, obese  Behavior/Cooperation:  friendly and cooperative  Speech: normal tone, normal rate, normal pitch, normal volume  Language: uses words appropriately; NO aphasia or dysarthria  Mood: steady  Affect:  congruent with mood and appropriate to situation/content   Thought Process: normal and logical  Thought Content: normal, no suicidality, no homicidality, delusions, or paranoia  Level of Consciousness: Alert and Oriented x3  Memory:  Intact  Attention/concentration: appropriate for age/education.   Fund of Knowledge: " appears adequate  Insight: Intact  Judgment: Intact    Significant Labs:   Last 24 Hours:   Recent Lab Results     None          Significant Imaging: None
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
## 2018-05-17 VITALS
HEIGHT: 71 IN | HEART RATE: 90 BPM | RESPIRATION RATE: 17 BRPM | TEMPERATURE: 99 F | BODY MASS INDEX: 44.1 KG/M2 | SYSTOLIC BLOOD PRESSURE: 160 MMHG | DIASTOLIC BLOOD PRESSURE: 75 MMHG | WEIGHT: 315 LBS

## 2018-05-17 PROCEDURE — 90853 GROUP PSYCHOTHERAPY: CPT | Mod: HP,HB,, | Performed by: PSYCHOLOGIST

## 2018-05-17 PROCEDURE — 99238 HOSP IP/OBS DSCHRG MGMT 30/<: CPT | Mod: AF,HB,, | Performed by: PSYCHIATRY & NEUROLOGY

## 2018-05-17 PROCEDURE — 25000003 PHARM REV CODE 250: Performed by: PSYCHIATRY & NEUROLOGY

## 2018-05-17 RX ORDER — GABAPENTIN 300 MG/1
300 CAPSULE ORAL 3 TIMES DAILY
Qty: 90 CAPSULE | Refills: 0 | Status: SHIPPED | OUTPATIENT
Start: 2018-05-17 | End: 2018-05-17

## 2018-05-17 RX ORDER — METFORMIN HYDROCHLORIDE 500 MG/1
500 TABLET ORAL 2 TIMES DAILY WITH MEALS
Qty: 180 TABLET | Refills: 0 | Status: SHIPPED | OUTPATIENT
Start: 2018-05-17 | End: 2019-05-17

## 2018-05-17 RX ORDER — GABAPENTIN 300 MG/1
300 CAPSULE ORAL 3 TIMES DAILY
Qty: 90 CAPSULE | Refills: 0 | Status: SHIPPED | OUTPATIENT
Start: 2018-05-17

## 2018-05-17 RX ORDER — METFORMIN HYDROCHLORIDE 500 MG/1
500 TABLET ORAL 2 TIMES DAILY WITH MEALS
Qty: 180 TABLET | Refills: 0 | Status: SHIPPED | OUTPATIENT
Start: 2018-05-17 | End: 2018-05-17

## 2018-05-17 RX ORDER — LISINOPRIL 10 MG/1
10 TABLET ORAL DAILY
Qty: 90 TABLET | Refills: 0 | Status: SHIPPED | OUTPATIENT
Start: 2018-05-18 | End: 2019-05-18

## 2018-05-17 RX ORDER — AMLODIPINE BESYLATE 10 MG/1
10 TABLET ORAL DAILY
Qty: 30 TABLET | Refills: 0 | Status: SHIPPED | OUTPATIENT
Start: 2018-05-18 | End: 2018-05-17

## 2018-05-17 RX ORDER — LISINOPRIL 10 MG/1
10 TABLET ORAL DAILY
Qty: 90 TABLET | Refills: 0 | Status: SHIPPED | OUTPATIENT
Start: 2018-05-18 | End: 2018-05-17

## 2018-05-17 RX ORDER — AMLODIPINE BESYLATE 10 MG/1
10 TABLET ORAL DAILY
Qty: 30 TABLET | Refills: 0 | Status: SHIPPED | OUTPATIENT
Start: 2018-05-18 | End: 2019-05-18

## 2018-05-17 RX ADMIN — FOLIC ACID 1 MG: 1 TABLET ORAL at 08:05

## 2018-05-17 RX ADMIN — METFORMIN HYDROCHLORIDE 500 MG: 500 TABLET, FILM COATED ORAL at 07:05

## 2018-05-17 RX ADMIN — AMLODIPINE BESYLATE 10 MG: 10 TABLET ORAL at 08:05

## 2018-05-17 RX ADMIN — LISINOPRIL 10 MG: 10 TABLET ORAL at 08:05

## 2018-05-17 RX ADMIN — THERA TABS 1 TABLET: TAB at 08:05
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
## 2018-05-17 NOTE — DISCHARGE INSTRUCTIONS
H&P, D/C summary, Med. Rec., transmitted to next level of care. [facility]  ([facility]).  Faxed discharge clinicals to clinic. [phone].

## 2018-05-17 NOTE — PROGRESS NOTES
H&P, D/C summary, Med. Rec., transmitted to next level of care. [facility]  ([facility]).  Faxed discharge clinicals to clinic. [phone].

## 2018-05-17 NOTE — ASSESSMENT & PLAN NOTE
cont home metformin 500 mg PO BID  HbA1c and lipid panel within normal limits   Low dose sliding scale insulin available but has not required, [facility] glucose bid discontinued   Will need to monitor on atypical neuroleptic

## 2018-05-17 NOTE — NURSING
Pt discharged home to care of self and family.Pt denies SI/HI/AVH, denies depressed mood or thoughts of self harm. His thought process is linear and organized.Pt is ambulatory with steady gait. Discharge medications reviewed and paper prescriptions provided for pt to fill at pharmacy of choice. Medication education provided. He is instructed to follow up with [facility] for medication management and outpatient services. Pt was given National Suicide prevention Lifeline and instructed to return to nearest emergency dept for re-occurring symptoms. Pt transported home with family and all personal belongings.
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
## 2018-05-17 NOTE — SUBJECTIVE & OBJECTIVE
"Interval History: see hospital course.     Family History     None        Social History Main Topics    Smoking status: Former Smoker     Types: Cigarettes     Quit date: 7/20/2012    Smokeless tobacco: Not on file    Alcohol use Yes      Comment: once every other month    Drug use: No    Sexual activity: Not on file     Psychotherapeutics     Start     Stop Route Frequency Ordered    05/08/18 2349  LORazepam tablet 2 mg      -- Oral Every 4 hours PRN 05/08/18 2251    05/08/18 2348  haloperidol tablet 5 mg      -- Oral Every 4 hours PRN 05/08/18 2251 05/08/18 2344  haloperidol lactate injection 5 mg  (Med - Acute  Behavioral Management)      -- IM Every 4 hours PRN 05/08/18 2250 05/08/18 2344  lorazepam (ATIVAN) injection 2 mg  (Med - Acute  Behavioral Management)      -- IM Every 4 hours PRN 05/08/18 2250           Review of Systems  Objective:     Vital Signs (Most Recent):  Temp: 99 °F (37.2 °C) (05/16/18 1913)  Pulse: 81 (05/16/18 1913)  Resp: 16 (05/16/18 1913)  BP: (!) 164/64 (05/16/18 1913) Vital Signs (24h Range):  Temp:  [99 °F (37.2 °C)] 99 °F (37.2 °C)  Pulse:  [81] 81  Resp:  [16] 16  BP: (164)/(64) 164/64     Height: 5' 11" (180.3 cm)  Weight: (!) 171.7 kg (378 lb 8.5 oz)  Body mass index is 52.79 kg/m².    No intake or output data in the 24 hours ending 05/17/18 0905    Physical Exam     Significant Labs:   Last 24 Hours:   Recent Lab Results     None          Significant Imaging: None  "

## 2018-05-17 NOTE — PROGRESS NOTES
Group Psychotherapy (PhD/LCSW)    Site: [facility]    Clinical status of patient: Inpatient    Date: 5/17/2018    Group Focus: Life Skills    Length of service: 11474 - 35-40 minutes    Number of patients in attendance: 9    Referred by: Acute Psychiatry Unit Treatment Team    Target symptoms: Psychosis    Patient's response to treatment:Active Listening    Progress toward goals: Progressing slowly    Interval History: Pt appeared alert and attentive in group. Pt engaged briefly but appropriately in a discussion of the ways changing one's self-talk can enhance one's mood and increase motivation.      Diagnosis: Schizophrenia     Plan: See Discharge Summary dated 5/17/18

## 2019-07-22 ENCOUNTER — HOSPITAL ENCOUNTER (EMERGENCY)
Facility: HOSPITAL | Age: 49
Discharge: HOME | End: 2019-07-22
Attending: EMERGENCY MEDICINE
Payer: MEDICAID

## 2019-07-22 VITALS
DIASTOLIC BLOOD PRESSURE: 83 MMHG | SYSTOLIC BLOOD PRESSURE: 165 MMHG | TEMPERATURE: 98 F | RESPIRATION RATE: 18 BRPM | WEIGHT: 315 LBS | HEIGHT: 71 IN | OXYGEN SATURATION: 95 % | BODY MASS INDEX: 44.1 KG/M2 | HEART RATE: 70 BPM

## 2019-07-22 DIAGNOSIS — M54.50 ACUTE MIDLINE LOW BACK PAIN WITHOUT SCIATICA: Primary | ICD-10-CM

## 2019-07-22 LAB
ANION GAP SERPL CALC-SCNC: 18 MMOL/L (ref 8–16)
BILIRUB UR QL STRIP: NEGATIVE
BUN SERPL-MCNC: 6 MG/DL (ref 6–30)
CHLORIDE SERPL-SCNC: 101 MMOL/L (ref 95–110)
CLARITY UR: CLEAR
COLOR UR: YELLOW
CREAT SERPL-MCNC: 1 MG/DL (ref 0.5–1.4)
GLUCOSE SERPL-MCNC: 95 MG/DL (ref 70–110)
GLUCOSE UR QL STRIP: NEGATIVE
HCT VFR BLD CALC: 44 %PCV (ref 36–54)
HGB UR QL STRIP: ABNORMAL
KETONES UR QL STRIP: NEGATIVE
LEUKOCYTE ESTERASE UR QL STRIP: NEGATIVE
MICROSCOPIC COMMENT: ABNORMAL
NITRITE UR QL STRIP: NEGATIVE
PH UR STRIP: 5 [PH] (ref 5–8)
POC IONIZED CALCIUM: 1.24 MMOL/L (ref 1.06–1.42)
POC TCO2 (MEASURED): 27 MMOL/L (ref 23–29)
POTASSIUM BLD-SCNC: 3.6 MMOL/L (ref 3.5–5.1)
PROT UR QL STRIP: NEGATIVE
RBC #/AREA URNS HPF: 1 /HPF (ref 0–4)
SAMPLE: ABNORMAL
SODIUM BLD-SCNC: 142 MMOL/L (ref 136–145)
SP GR UR STRIP: 1.02 (ref 1–1.03)
URN SPEC COLLECT METH UR: ABNORMAL
UROBILINOGEN UR STRIP-ACNC: ABNORMAL EU/DL
WBC #/AREA URNS HPF: 6 /HPF (ref 0–5)
WBC CLUMPS URNS QL MICRO: ABNORMAL

## 2019-07-22 PROCEDURE — 99284 EMERGENCY DEPT VISIT MOD MDM: CPT

## 2019-07-22 PROCEDURE — 85610 PROTHROMBIN TIME: CPT

## 2019-07-22 PROCEDURE — 84295 ASSAY OF SERUM SODIUM: CPT

## 2019-07-22 PROCEDURE — 81000 URINALYSIS NONAUTO W/SCOPE: CPT

## 2019-07-22 PROCEDURE — 82565 ASSAY OF CREATININE: CPT

## 2019-07-22 PROCEDURE — 83605 ASSAY OF LACTIC ACID: CPT

## 2019-07-22 PROCEDURE — 25000003 PHARM REV CODE 250: Performed by: PHYSICIAN ASSISTANT

## 2019-07-22 PROCEDURE — 87491 CHLMYD TRACH DNA AMP PROBE: CPT

## 2019-07-22 PROCEDURE — 99900035 HC TECH TIME PER 15 MIN (STAT)

## 2019-07-22 PROCEDURE — 82803 BLOOD GASES ANY COMBINATION: CPT

## 2019-07-22 PROCEDURE — 85014 HEMATOCRIT: CPT

## 2019-07-22 PROCEDURE — 82330 ASSAY OF CALCIUM: CPT

## 2019-07-22 PROCEDURE — 84132 ASSAY OF SERUM POTASSIUM: CPT

## 2019-07-22 RX ORDER — IBUPROFEN 600 MG/1
600 TABLET ORAL
Status: COMPLETED | OUTPATIENT
Start: 2019-07-22 | End: 2019-07-22

## 2019-07-22 RX ORDER — IBUPROFEN 600 MG/1
600 TABLET ORAL EVERY 6 HOURS PRN
Qty: 20 TABLET | Refills: 0 | Status: SHIPPED | OUTPATIENT
Start: 2019-07-22

## 2019-07-22 RX ADMIN — IBUPROFEN 600 MG: 600 TABLET ORAL at 10:07

## 2019-07-23 LAB
C TRACH DNA SPEC QL NAA+PROBE: NOT DETECTED
N GONORRHOEA DNA SPEC QL NAA+PROBE: NOT DETECTED

## 2019-07-23 NOTE — DISCHARGE INSTRUCTIONS
Ibuprofen as needed for pain. Please follow-up with your primary care provider for re-evaluation.      Please return to this ED if you begin with worsening pain, if unable to walk or bear weight, if you begin with shortness of breath or difficulty breathing, if you begin with chest pain, if any other problems occur.

## 2019-07-23 NOTE — ED PROVIDER NOTES
Encounter Date: 2019    SCRIBE #1 NOTE: I, Kim Xiomara, am scribing for, and in the presence of,  Randal Whitman PA-C. I have scribed the following portions of the note - Other sections scribed: HPI,ROS,PE.       History     Chief Complaint   Patient presents with    Back Pain     pt c/o lower back pain X 2 wks     CC: Back pain    HPI: This is a 49 y.o.male patient, with a PMHx of diabetes mellitus and hypertension, presenting to the ED with a complaint of middle, lower back pain, that began x3 weeks ago. Patient states he lifts heavy objects at work. Patient reports urinary frequency. He denies taking his Metformin. He reports he was told he was diabetic x1 month ago. No recent trauma or injury. Patient denies any fever, chills, shortness of breath, chest pain, neck pain, abdominal pain, headaches, cough, sore throat, nausea, vomiting, diarrhea, dysuria, or any other associated symptoms. No prior Tx. No alleviating or aggravating factors. No known drug allergies. Denies CAD or kidney problems.      The history is provided by the patient. No  was used.     Review of patient's allergies indicates:  No Known Allergies  Past Medical History:   Diagnosis Date    Brief reactive psychosis     Diabetes mellitus     Hypertension      History reviewed. No pertinent surgical history.  History reviewed. No pertinent family history.  Social History     Tobacco Use    Smoking status: Former Smoker     Types: Cigarettes     Last attempt to quit: 2012     Years since quittin.0   Substance Use Topics    Alcohol use: Yes     Comment: once every other month    Drug use: No     Review of Systems   Constitutional: Negative for chills and fever.   HENT: Negative for congestion, ear pain, rhinorrhea and sore throat.    Eyes: Negative for pain and visual disturbance.   Respiratory: Negative for cough and shortness of breath.    Cardiovascular: Negative for chest pain.   Gastrointestinal:  Negative for abdominal pain, diarrhea, nausea and vomiting.   Genitourinary: Positive for frequency. Negative for dysuria.   Musculoskeletal: Positive for back pain. Negative for neck pain.   Skin: Negative for rash.   Neurological: Negative for headaches.       Physical Exam     Initial Vitals [07/22/19 1937]   BP Pulse Resp Temp SpO2   (!) 158/95 81 16 98.3 °F (36.8 °C) 97 %      MAP       --         Physical Exam    Nursing note and vitals reviewed.  Constitutional: He appears well-developed and well-nourished. No distress.   Overall well-appearing nontoxic.  Resting comfortably on exam table.  Ambulating about the ED with normal, steady gait.  Morbidly obese.   HENT:   Head: Normocephalic and atraumatic.   Mouth/Throat: Oropharynx is clear and moist.   Eyes: EOM are normal. Pupils are equal, round, and reactive to light.   Neck: Normal range of motion. Neck supple.   Cardiovascular: Normal rate, regular rhythm and normal heart sounds. Exam reveals no gallop and no friction rub.    No murmur heard.  Normal sinus rhythm on auscultation. 1+ DP bilaterally. Trace pitting edema to bilateral lower extremities.   Pulmonary/Chest: Effort normal. No respiratory distress.   Abdominal: Soft. Bowel sounds are normal. There is no tenderness. There is no rebound and no guarding.   Musculoskeletal: Normal range of motion.   Mild TTP to midline lumbar spine.  No bony deformity.  No swelling or overlying skin changes.   Lymphadenopathy:     He has no cervical adenopathy.   Neurological: He is alert and oriented to person, place, and time. He has normal strength. No cranial nerve deficit or sensory deficit. GCS score is 15. GCS eye subscore is 4. GCS verbal subscore is 5. GCS motor subscore is 6.   Skin: Skin is warm and dry. Capillary refill takes less than 2 seconds.   Psychiatric: He has a normal mood and affect. His behavior is normal. Judgment and thought content normal.         ED Course   Procedures  Labs Reviewed    URINALYSIS, REFLEX TO URINE CULTURE - Abnormal; Notable for the following components:       Result Value    Occult Blood UA 1+ (*)     Urobilinogen, UA 4.0-6.0 (*)     All other components within normal limits    Narrative:     Preferred Collection Type->Urine, Clean Catch   URINALYSIS MICROSCOPIC - Abnormal; Notable for the following components:    WBC, UA 6 (*)     WBC Clumps, UA Occasional (*)     All other components within normal limits    Narrative:     Preferred Collection Type->Urine, Clean Catch   ISTAT PROCEDURE - Abnormal; Notable for the following components:    POC Anion Gap 18 (*)     All other components within normal limits   C. TRACHOMATIS/N. GONORRHOEAE BY AMP DNA   ISTAT CHEM8          Imaging Results          X-Ray Lumbar Spine Ap And Lateral (Final result)  Result time 07/22/19 22:00:02    Final result by French Orr MD (07/22/19 22:00:02)                 Impression:      No acute lumbar spine abnormalities identified.      Electronically signed by: French Orr MD  Date:    07/22/2019  Time:    22:00             Narrative:    EXAMINATION:  XR LUMBAR SPINE AP AND LATERAL    CLINICAL HISTORY:  Low back pain, <6wks, no red flags, no prior management;    TECHNIQUE:  AP, lateral and spot images were performed of the lumbar spine.    COMPARISON:  None    FINDINGS:  Lumbar spine alignment is within normal limits.  No evidence of acute lumbar spine fracture or subluxation.  Intervertebral disc spaces appear fairly well maintained.  Anterior spurring is seen within the lower thoracic spine.  Visualized sacrum is unremarkable.                                 Medical Decision Making:   Differential Diagnosis:   Fracture, contusion, sprain/strain, arthritis, UTI, STI  Clinical Tests:   Lab Tests: Ordered and Reviewed  Radiological Study: Ordered and Reviewed  ED Management:  Previous urine culture without growth.    49-year-old male with atraumatic low back pain, constant x3 weeks.  For some  rising from seated position, worse with bending over, worse with ambulation.  No radiculopathy or paresthesia.  No previous injury or surgery.  No swelling or overlying skin changes.  No bowel or bladder incontinence or retention.  He does admit to increased urinary frequency over the past 3 weeks as well. Denies dysuria.  Denies penile discharge. Denies suspicion for STI.  No flank pain. There is mild lower lumbar midline spinal tenderness. No obvious bony deformity.  No swelling or overlying skin changes.  No open wound.  1+ DP bilaterally. Denies saddle anesthesia. No GI issues, no bowel or bladder incontinence or retention. No saddle anesthesia. No hx spinal issues. No trauma. No paresthesia. Doubt cauda equina or cord compression. No fever, no weight loss, no recent infection, no IV drug use. Doubt epidural abscess.  No focal weakness. No ripping/tearing sensation. No hx AAA or aortic issues.  Neurovascularly intact distally.    X-ray, urine pending.  I will check BUN creatinine given obesity, hypertension, increased urinary frequency. Last BUN creatinine 05/08/2019 was within normal limits. No mention of pitting edema to lower extremities at this time.  I suspect dependent edema. No cardiac complaints or history of CHF. Lungs are clear.    BUN creatinine within normal limits. X-ray negative for acute fracture.  Urinalysis with pyuria without leukocytes or nitrites.  I will await urine culture.  GC pending.  No fever.  NSAIDs, PCP follow-up, return precautions given.                      Clinical Impression:       ICD-10-CM ICD-9-CM   1. Acute midline low back pain without sciatica M54.5 724.2         Disposition:   Disposition: Discharged  Condition: Stable         Scribe attestation: I, Randal Whitman, personally performed the services described in this documentation. All medical record entries made by the scribe were at my direction and in my presence.  I have reviewed the chart and agree that the record  reflects my personal performance and is accurate and complete.               Randal Whitman PA-C  07/22/19 8732

## 2022-02-02 ENCOUNTER — HOSPITAL ENCOUNTER (EMERGENCY)
Facility: HOSPITAL | Age: 52
Discharge: HOME | End: 2022-02-02
Attending: EMERGENCY MEDICINE
Payer: MEDICAID

## 2022-02-02 VITALS
SYSTOLIC BLOOD PRESSURE: 168 MMHG | HEART RATE: 94 BPM | DIASTOLIC BLOOD PRESSURE: 99 MMHG | OXYGEN SATURATION: 95 % | WEIGHT: 315 LBS | TEMPERATURE: 100 F | HEIGHT: 69 IN | BODY MASS INDEX: 46.65 KG/M2 | RESPIRATION RATE: 18 BRPM
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
## 2022-02-02 PROCEDURE — 99282 PR EMERGENCY DEPT VISIT,LEVEL II: ICD-10-PCS | Mod: ,,, | Performed by: EMERGENCY MEDICINE

## 2022-02-02 PROCEDURE — 25000003 PHARM REV CODE 250: Performed by: EMERGENCY MEDICINE

## 2022-02-02 PROCEDURE — 99283 EMERGENCY DEPT VISIT LOW MDM: CPT

## 2022-02-02 PROCEDURE — 99282 EMERGENCY DEPT VISIT SF MDM: CPT | Mod: ,,, | Performed by: EMERGENCY MEDICINE

## 2022-02-02 RX ORDER — ACETAMINOPHEN 325 MG/1
650 TABLET ORAL
Status: COMPLETED | OUTPATIENT
Start: 2022-02-02 | End: 2022-02-02

## 2022-02-02 RX ADMIN — ACETAMINOPHEN 650 MG: 325 TABLET ORAL at 09:02

## 2022-02-03 NOTE — ED TRIAGE NOTES
Pt had a mechanical fall today and since the fall he has had pain to his arms and back    Patient Identifiers for Adriano Villar Jr. checked and correct  LOC: The patient is awake, alert and aware of environment with an appropriate affect, the patient is oriented x 3 and speaking appropriate.  APPEARANCE: Patient resting comfortably and in no acute distress, patient is clean and well groomed, patient's clothing is properly fastened.  SKIN: The skin is warm and dry, patient has normal skin turgor and moist mucus membranes,no rashes or lesions.Skin Intact , No Breakdown Noted  Musculoskeletal :  Normal range of motion noted. Moves all extremeties well, No swelling or tenderness noted. Pt reports pain to arms and back after fall  RESPIRATORY: Airway is open and patent, respirations are spontaneous, patient has a normal effort and rate  CARDIAC: Patient has a normal rate and rhythm, no periphreal edema noted, capillary refill < 3 seconds.   ABDOMEN: Soft and non tender to palpation, no distention noted.   PULSES: 2+  And symmetrical in all extremeties  NEUROLOGIC: PERRL. facial expression is symmetrical, patient moving all extremities, normal sensation in all extremities when touched with a finger.The patient is awake, alert and cooperative with a calm affect, patient is aware of environment.    Will continue to monitor

## 2022-02-03 NOTE — ED PROVIDER NOTES
"Encounter Date: 2/2/2022    SCRIBE #1 NOTE: I, Ghazal Yu , am scribing for, and in the presence of,  Giuliano Butler MD. I have scribed the following portions of the note - Other sections scribed: HPI ROS PE.            ED Disposition Condition    Discharge Stable        ED Prescriptions     None        Follow-up Information     Follow up With Specialties Details Why Contact Info Additional Information    [facility]   [address]  [facility] [id number] [phone] [facility] Please park in surface lot and check in at central registration desk    [facility] [address]    [address] [facility] [id number] [phone]            Source of History:  Patient    Chief complaint:  Fall (Pt had a mechanical fall today and since the fall he has had pain to his arms and back. )      HPI:  Adriano Villar Jr. is a 51 y.o. male presenting with fall. Patient states he was walking and tripped over door frame. Patient reports falling face down hitting his chest on the floor. Patient denies trauma to head. Patient notes pain on his left flank and his right elbow. He describes his elbow pain as "achy" and his left flank pain as  "sharp". Both are mild.  He notes his flank pain is worsen when taking a deep breath. Patient states he have shortness of breath after the fall. No other exacerbating or alleviating factors.  He has not taken any other medications for the pain today.  No other associated symptoms.          ROS: As per HPI and below:    General: No fever.  No chills.  Eyes: No visual changes.  Head: No headache.    Integument: No rashes or lesions.  Chest: POS shortness of breath.  Cardiovascular: No chest pain.  Abdomen: No abdominal pain.  No nausea or vomiting.  Urinary: No abnormal urination. POS flank pain.  Neurologic: No focal weakness.  No numbness.  Hematologic: No easy bruising.  Endocrine: No " excessive thirst or urination.  Musc: Pos arthralgias       Review of patient's allergies indicates:  No Known Allergies    No current facility-administered medications on file prior to encounter.     Current Outpatient Medications on File Prior to Encounter   Medication Sig Dispense Refill    amLODIPine (NORVASC) 10 MG tablet Take 10 mg by mouth once daily.      cyclobenzaprine (FLEXERIL) 10 MG tablet Take 1 tablet (10 mg total) by mouth 3 (three) times daily as needed for Muscle spasms. 15 tablet 0    gabapentin (NEURONTIN) 300 MG capsule Take 1 capsule (300 mg total) by mouth 3 (three) times daily. 90 capsule 0    hydrOXYzine HCl (ATARAX) 25 MG tablet Take 1 tablet (25 mg total) by mouth nightly as needed (Insomnia). 20 tablet 2    ibuprofen (ADVIL,MOTRIN) 600 MG tablet Take 1 tablet (600 mg total) by mouth every 6 (six) hours as needed for Pain. 20 tablet 0    lisinopril 10 MG tablet Take 10 mg by mouth once daily.      meloxicam (MOBIC) 15 MG tablet Take 15 mg by mouth once daily.      metFORMIN (GLUCOPHAGE) 500 MG tablet Take 500 mg by mouth 2 (two) times daily with meals.      naproxen (NAPROSYN) 500 MG tablet Take 1 tablet (500 mg total) by mouth 2 (two) times daily as needed. 20 tablet 0       PMH:  As per HPI and below:  Past Medical History:   Diagnosis Date    Brief reactive psychosis     Diabetes mellitus     Hypertension      No past surgical history on file.    Social History     Socioeconomic History    Marital status: Single   Tobacco Use    Smoking status: Former Smoker     Types: Cigarettes     Quit date: 2012     Years since quittin.5   Substance and Sexual Activity    Alcohol use: Yes     Comment: once every other month    Drug use: No       No family history on file.    Physical Exam:    Vitals:    22   BP: (!) 168/99   Pulse: 94   Resp: 18   Temp: 99.8 °F (37.7 °C)     Appearance: No acute distress.  Skin: No rashes seen.  Good turgor.  No abrasions.  No  ecchymoses.  Eyes: No conjunctival injection. EOMI, PERRL.  ENT: Oropharynx clear.    Chest:  No increased work of breathing, bilateral chest rise.  Cardiovascular: Regular rate and rhythm.  Normal equal bilateral radial pulses.  Abdomen: Soft.  Not distended.  Nontender.  No guarding.  No rebound. No Masses  Musculoskeletal: Good range of motion all joints.  No deformities.  Neck supple, full range of motion, no obvious deformity. No bony tenderness on right elbow, full ROM with flexion and extension with internal and external rotation.   Neurologic: Moves all extremities.  Normal sensation.  No facial droop.  Normal speech.    Mental Status:  Alert and oriented x 3.  Appropriate, conversant.          Laboratory Studies:  Labs Reviewed - No data to display      Imaging Results    None         Medications Given:  Medications   acetaminophen tablet 650 mg (650 mg Oral Given 2/2/22 2105)       Discussed with: pt    MDM:    51 y.o. male with right elbow and left flank pain after a ground level fall.  He has full range of motion, do not suspect elbow fracture dislocation.  He is neurovascularly intact.  He has some mild flank tenderness over his lower left lateral ribs, however there is no step-off crepitus, he has normal oxygen saturation without shortness of breath.  My suspicion for rib fracture is low given mild severity of his pain.  Given Tylenol in the ED.  Recommended continuing anti-inflammatories or other over-the-counter pain meds at home.  Advised pt to follow up with PCP or return if concerning symptoms arise. Pt understands and agrees with plan. Will d/c home.          Diagnostic Impression:    1. Fall, initial encounter               Giuliano Butler MD  02/02/22 2108

## 2023-01-25 ENCOUNTER — HOSPITAL ENCOUNTER (EMERGENCY)
Facility: HOSPITAL | Age: 53
Discharge: HOME | End: 2023-01-25
Attending: EMERGENCY MEDICINE
Payer: MEDICAID

## 2023-01-25 VITALS
BODY MASS INDEX: 63.5 KG/M2 | SYSTOLIC BLOOD PRESSURE: 159 MMHG | DIASTOLIC BLOOD PRESSURE: 101 MMHG | HEART RATE: 95 BPM | TEMPERATURE: 99 F | RESPIRATION RATE: 20 BRPM | WEIGHT: 315 LBS | OXYGEN SATURATION: 94 %

## 2023-01-25 DIAGNOSIS — F99 PSYCHIATRIC DISORDER: ICD-10-CM

## 2023-01-25 DIAGNOSIS — F41.9 ANXIETY: Primary | ICD-10-CM

## 2023-01-25 PROCEDURE — 99283 EMERGENCY DEPT VISIT LOW MDM: CPT

## 2023-01-25 PROCEDURE — 99291 PR CRITICAL CARE, E/M 30-74 MINUTES: ICD-10-PCS | Mod: ,,, | Performed by: EMERGENCY MEDICINE

## 2023-01-25 PROCEDURE — 99291 CRITICAL CARE FIRST HOUR: CPT | Mod: ,,, | Performed by: EMERGENCY MEDICINE

## 2023-01-25 PROCEDURE — 25000003 PHARM REV CODE 250: Performed by: EMERGENCY MEDICINE

## 2023-01-25 RX ORDER — LORAZEPAM 1 MG/1
1 TABLET ORAL
Status: COMPLETED | OUTPATIENT
Start: 2023-01-25 | End: 2023-01-25

## 2023-01-25 RX ADMIN — LORAZEPAM 1 MG: 1 TABLET ORAL at 07:01

## 2023-01-26 ENCOUNTER — HOSPITAL ENCOUNTER (EMERGENCY)
Facility: HOSPITAL | Age: 53
Discharge: PSYCHIATRIC OR SUBSTANCE USE FACILITY | End: 2023-01-26
Attending: EMERGENCY MEDICINE
Payer: MEDICAID

## 2023-01-26 VITALS
BODY MASS INDEX: 63.5 KG/M2 | TEMPERATURE: 98 F | HEART RATE: 100 BPM | RESPIRATION RATE: 18 BRPM | WEIGHT: 315 LBS | SYSTOLIC BLOOD PRESSURE: 138 MMHG | OXYGEN SATURATION: 100 % | DIASTOLIC BLOOD PRESSURE: 86 MMHG

## 2023-01-26 DIAGNOSIS — F23 ACUTE PSYCHOSIS: Primary | ICD-10-CM

## 2023-01-26 LAB
ALBUMIN SERPL BCP-MCNC: 4 G/DL (ref 3.5–5.2)
ALP SERPL-CCNC: 62 U/L (ref 55–135)
ALT SERPL W/O P-5'-P-CCNC: 37 U/L (ref 10–44)
AMPHET+METHAMPHET UR QL: NEGATIVE
ANION GAP SERPL CALC-SCNC: 11 MMOL/L (ref 8–16)
APAP SERPL-MCNC: <3 UG/ML (ref 10–20)
AST SERPL-CCNC: 26 U/L (ref 10–40)
BACTERIA #/AREA URNS AUTO: ABNORMAL /HPF
BARBITURATES UR QL SCN>200 NG/ML: NEGATIVE
BASOPHILS # BLD AUTO: 0.05 K/UL (ref 0–0.2)
BASOPHILS NFR BLD: 0.5 % (ref 0–1.9)
BENZODIAZ UR QL SCN>200 NG/ML: NEGATIVE
BILIRUB SERPL-MCNC: 0.4 MG/DL (ref 0.1–1)
BILIRUB UR QL STRIP: NEGATIVE
BUN SERPL-MCNC: 11 MG/DL (ref 6–20)
BZE UR QL SCN: NEGATIVE
CALCIUM SERPL-MCNC: 10 MG/DL (ref 8.7–10.5)
CANNABINOIDS UR QL SCN: NEGATIVE
CHLORIDE SERPL-SCNC: 101 MMOL/L (ref 95–110)
CLARITY UR REFRACT.AUTO: CLEAR
CO2 SERPL-SCNC: 26 MMOL/L (ref 23–29)
COLOR UR AUTO: YELLOW
CREAT SERPL-MCNC: 1 MG/DL (ref 0.5–1.4)
CREAT UR-MCNC: 227 MG/DL (ref 23–375)
DIFFERENTIAL METHOD: ABNORMAL
EOSINOPHIL # BLD AUTO: 0.1 K/UL (ref 0–0.5)
EOSINOPHIL NFR BLD: 0.9 % (ref 0–8)
ERYTHROCYTE [DISTWIDTH] IN BLOOD BY AUTOMATED COUNT: 17.2 % (ref 11.5–14.5)
EST. GFR  (NO RACE VARIABLE): >60 ML/MIN/1.73 M^2
ETHANOL SERPL-MCNC: <10 MG/DL
GLUCOSE SERPL-MCNC: 112 MG/DL (ref 70–110)
GLUCOSE UR QL STRIP: NEGATIVE
HCT VFR BLD AUTO: 45.7 % (ref 40–54)
HGB BLD-MCNC: 13.4 G/DL (ref 14–18)
HGB UR QL STRIP: NEGATIVE
HYALINE CASTS UR QL AUTO: 1 /LPF
IMM GRANULOCYTES # BLD AUTO: 0.05 K/UL (ref 0–0.04)
IMM GRANULOCYTES NFR BLD AUTO: 0.5 % (ref 0–0.5)
KETONES UR QL STRIP: ABNORMAL
LEUKOCYTE ESTERASE UR QL STRIP: ABNORMAL
LYMPHOCYTES # BLD AUTO: 1.7 K/UL (ref 1–4.8)
LYMPHOCYTES NFR BLD: 17 % (ref 18–48)
MCH RBC QN AUTO: 22.5 PG (ref 27–31)
MCHC RBC AUTO-ENTMCNC: 29.3 G/DL (ref 32–36)
MCV RBC AUTO: 77 FL (ref 82–98)
METHADONE UR QL SCN>300 NG/ML: NEGATIVE
MICROSCOPIC COMMENT: ABNORMAL
MONOCYTES # BLD AUTO: 0.6 K/UL (ref 0.3–1)
MONOCYTES NFR BLD: 6 % (ref 4–15)
NEUTROPHILS # BLD AUTO: 7.7 K/UL (ref 1.8–7.7)
NEUTROPHILS NFR BLD: 75.1 % (ref 38–73)
NITRITE UR QL STRIP: NEGATIVE
NON-SQ EPI CELLS #/AREA URNS AUTO: 0 /HPF
NRBC BLD-RTO: 0 /100 WBC
OPIATES UR QL SCN: NEGATIVE
PCP UR QL SCN>25 NG/ML: NEGATIVE
PH UR STRIP: 5 [PH] (ref 5–8)
PLATELET # BLD AUTO: 336 K/UL (ref 150–450)
PMV BLD AUTO: 10 FL (ref 9.2–12.9)
POTASSIUM SERPL-SCNC: 3.8 MMOL/L (ref 3.5–5.1)
PROT SERPL-MCNC: 9.8 G/DL (ref 6–8.4)
PROT UR QL STRIP: ABNORMAL
RBC # BLD AUTO: 5.95 M/UL (ref 4.6–6.2)
RBC #/AREA URNS AUTO: 10 /HPF (ref 0–4)
SARS-COV-2 RDRP RESP QL NAA+PROBE: NEGATIVE
SODIUM SERPL-SCNC: 138 MMOL/L (ref 136–145)
SP GR UR STRIP: 1.02 (ref 1–1.03)
SQUAMOUS #/AREA URNS AUTO: 0 /HPF
TOXICOLOGY INFORMATION: NORMAL
TSH SERPL DL<=0.005 MIU/L-ACNC: 3.5 UIU/ML (ref 0.4–4)
URN SPEC COLLECT METH UR: ABNORMAL
WBC # BLD AUTO: 10.24 K/UL (ref 3.9–12.7)
WBC #/AREA URNS AUTO: 20 /HPF (ref 0–5)

## 2023-01-26 PROCEDURE — 96372 THER/PROPH/DIAG INJ SC/IM: CPT

## 2023-01-26 PROCEDURE — 99291 CRITICAL CARE FIRST HOUR: CPT | Mod: CS,,, | Performed by: EMERGENCY MEDICINE

## 2023-01-26 PROCEDURE — 81001 URINALYSIS AUTO W/SCOPE: CPT | Performed by: EMERGENCY MEDICINE

## 2023-01-26 PROCEDURE — 84443 ASSAY THYROID STIM HORMONE: CPT | Performed by: EMERGENCY MEDICINE

## 2023-01-26 PROCEDURE — 87086 URINE CULTURE/COLONY COUNT: CPT | Performed by: EMERGENCY MEDICINE

## 2023-01-26 PROCEDURE — 82077 ASSAY SPEC XCP UR&BREATH IA: CPT | Performed by: EMERGENCY MEDICINE

## 2023-01-26 PROCEDURE — 80307 DRUG TEST PRSMV CHEM ANLYZR: CPT | Performed by: EMERGENCY MEDICINE

## 2023-01-26 PROCEDURE — U0002 COVID-19 LAB TEST NON-CDC: HCPCS | Performed by: EMERGENCY MEDICINE

## 2023-01-26 PROCEDURE — 80143 DRUG ASSAY ACETAMINOPHEN: CPT | Performed by: EMERGENCY MEDICINE

## 2023-01-26 PROCEDURE — 63600175 PHARM REV CODE 636 W HCPCS

## 2023-01-26 PROCEDURE — 99291 PR CRITICAL CARE, E/M 30-74 MINUTES: ICD-10-PCS | Mod: CS,,, | Performed by: EMERGENCY MEDICINE

## 2023-01-26 PROCEDURE — 85025 COMPLETE CBC W/AUTO DIFF WBC: CPT | Performed by: EMERGENCY MEDICINE

## 2023-01-26 PROCEDURE — 99285 EMERGENCY DEPT VISIT HI MDM: CPT

## 2023-01-26 PROCEDURE — 80053 COMPREHEN METABOLIC PANEL: CPT | Performed by: EMERGENCY MEDICINE

## 2023-01-26 RX ORDER — HALOPERIDOL 5 MG/ML
5 INJECTION INTRAMUSCULAR
Status: COMPLETED | OUTPATIENT
Start: 2023-01-26 | End: 2023-01-26

## 2023-01-26 RX ORDER — LORAZEPAM 2 MG/ML
2 INJECTION INTRAMUSCULAR
Status: COMPLETED | OUTPATIENT
Start: 2023-01-26 | End: 2023-01-26

## 2023-01-26 RX ORDER — DIPHENHYDRAMINE HYDROCHLORIDE 50 MG/ML
50 INJECTION INTRAMUSCULAR; INTRAVENOUS
Status: COMPLETED | OUTPATIENT
Start: 2023-01-26 | End: 2023-01-26

## 2023-01-26 RX ORDER — LORAZEPAM 1 MG/1
1 TABLET ORAL
Status: DISCONTINUED | OUTPATIENT
Start: 2023-01-26 | End: 2023-01-26

## 2023-01-26 RX ORDER — LORAZEPAM 1 MG/1
2 TABLET ORAL EVERY 4 HOURS PRN
Status: DISCONTINUED | OUTPATIENT
Start: 2023-01-26 | End: 2023-01-26 | Stop reason: HOSPADM

## 2023-01-26 RX ORDER — HALOPERIDOL 5 MG/ML
INJECTION INTRAMUSCULAR
Status: COMPLETED
Start: 2023-01-26 | End: 2023-01-26

## 2023-01-26 RX ORDER — DIPHENHYDRAMINE HYDROCHLORIDE 50 MG/ML
INJECTION INTRAMUSCULAR; INTRAVENOUS
Status: COMPLETED
Start: 2023-01-26 | End: 2023-01-26

## 2023-01-26 RX ORDER — LORAZEPAM 2 MG/ML
INJECTION INTRAMUSCULAR
Status: COMPLETED
Start: 2023-01-26 | End: 2023-01-26

## 2023-01-26 RX ADMIN — HALOPERIDOL 5 MG: 5 INJECTION INTRAMUSCULAR at 02:01

## 2023-01-26 RX ADMIN — LORAZEPAM 2 MG: 2 INJECTION INTRAMUSCULAR; INTRAVENOUS at 02:01

## 2023-01-26 RX ADMIN — LORAZEPAM 2 MG: 2 INJECTION INTRAMUSCULAR at 02:01

## 2023-01-26 RX ADMIN — HALOPERIDOL LACTATE 5 MG: 5 INJECTION, SOLUTION INTRAMUSCULAR at 02:01

## 2023-01-26 RX ADMIN — DIPHENHYDRAMINE HYDROCHLORIDE 50 MG: 50 INJECTION INTRAMUSCULAR; INTRAVENOUS at 02:01

## 2023-01-26 RX ADMIN — DIPHENHYDRAMINE HYDROCHLORIDE 50 MG: 50 INJECTION, SOLUTION INTRAMUSCULAR; INTRAVENOUS at 02:01

## 2023-01-26 NOTE — ED NOTES
Pt asleep but wakes up with minimal stimulation. Pt informed if he can remain calm and cooperative that the restraints and be removed. Pt will not verbally agree to remain calm and cooperative. He refuses to answer the question when asked.

## 2023-01-26 NOTE — DISCHARGE INSTRUCTIONS
As I explained, I recommended a psychiatric evaluation and hospitalization.  You and your family member declined to pursue this.  You note that you have a outpatient mental health physician that you can follow-up with.  I explained that it can take a long time to be seen and get the symptoms under control.  You received a low-dose antianxiety medication in the ER.  You may return to the ER at any point to continue your evaluation.  Please return for any symptoms wanting to harm herself or anybody else, feeling unsafe, worsening hallucinations, or other concerns.

## 2023-01-26 NOTE — HPI
"HPI     Anxiety       Denies SI/HI. Family states he is going through a "mental crisis". Tactile hallucinations (itchy), feels like people are out to get him.       51 yo M with pmhx DM, HTN, schizophrenia vs brief reactive psychosis, obesity presents with a chief complaint of anxiety.  Patient is accompanied by his uncle who is "like a brother to him".  They note that patient has been suffering from increased anxiety for the past few days since Saturday  He reports tactile hallucinations and feeling "itchy".  He notes that he has been experiencing hallucinations that the TV and radio was talking to him.  They are not commanding him to do anything.  He denies any SI, HI.  No visual hallucinations.  No drug or alcohol use.  The patient and his uncle note that in the past when the symptoms were present, they would improve when out of medication prescribed by his psychiatrist.  He follows with Dr. Duncan at [facility].  He notes that his symptoms have previously been controlled on psychiatric medication but patient's stop taking them as he did not like the way they made him feel drowsy.  He reports feeling safe at home and does not want to go to psychiatric facility.  His uncle notes that he does not feel the patient is severe enough to require hospitalization but wanted a medication to help calm him down.  They feel they can contact the psychiatrist tomorrow.  No somatic complaints.    Psych Interview:    Patient is sitting up bedside. Patient is irritable and is not willing to participate in interview. Patient does not make eye contact throughout interview.     Patient initially states he arrived to the ED yesterday, then states he was picked up by an ambulance today.   Patient states he was taken to the ED because "they said I was belligerent and acting out." He is not willing to further elaborate, later states he does not know why he is here. Patient is unwilling to participate in interview, either not " "responding, grunting, or stating "its in my charts".    Patient denies anxiety or hallucinations, stating "I feel good." Patient does not respond to contradictions per ED notes.   Patient is unwilling to discuss current symptoms or past psych history, stating "its in my charts.   Patient asks "can I get my shot now...." but is unwilling to elaborate on what shot or why he takes it.     Collateral: Patient does not eugenie permission to speak with family    Past Medical/Surgical History  Past Medical History:   Diagnosis Date    Brief reactive psychosis     Diabetes mellitus     Hypertension      No past surgical history on file.    Past Psychiatric History:  Previous Medication Trials: yes "I don't know... y'all got the records"  Previous Psychiatric Hospitalizations: yes "yall got the records"  Previous Suicide Attempts: no   History of Violence:  "its all in my records"  Outpatient Psychiatrist: no    Social History:  Marital Status: "I don't know"  Children: YUSRA   Employment Status/Info:  YUSRA  Education:  [employer]  Special Ed:  [employer]  Housing Status: [employer]  History of phys/sexual abuse:  [employer]  Access to gun:  [employer]    Substance Abuse History:  Recreational Drugs:  YUSRA  Use of Alcohol:  YUSRA  Tobacco Use:  YUSRA  Rehab History:  [employer] [facility]      Legal History:  Past Charges/Incarcerations:  YUSRA ,   Pending charges:  YUSRA      Family Psychiatric History:   YUSRA    Psychiatric Review Of Systems - Is patient experiencing or having changes in:  sleep: YUSRA  appetite: YUSRA  weight: YUSRA  energy/anergy: YUSRA  interest/pleasure/anhedonia: YUSRA  somatic symptoms: YUSRA  anxiety/panic: YUSRA, per ER note, increased  guilty/hopelessness: YUSRA  concentration: YUSRA  S.I.B.s/risky behavior: YUSRA  Irritability: YUSRA  Racing thoughts: YUSRA  Impulsive behaviors: YUSRA  Paranoia:YUSRA per ER note, paranoid  AVH:YUSRA, per ER note, yes, tactile hallucinations and believing TV/radio speaking tohim  Suicidal thoughts/plan/intent: no     Discharge Mental Status Exam  Appearance: " unremarkable, age appropriate, obese  Behavior/Copperation: uncooperative  Speech: increased latency of response, few responses  Mood:  irritable  Affect: normal and irritable  Thought Process:  YUSRA  Thought Content:  YUSRA  Orientation:  grossly intact, person, place, situation  Memory: Unable to assess  Attention/Concentration:  YUSRA  Cognition:  [facility]  Insight:  [facility]  Judgement:  YUSRA

## 2023-01-26 NOTE — ED NOTES
Pt up out of the bed he came to the door and threw his urinal, he took off his gown and began yelling at the staff. Pt's room closed he began punching the door making verbal threats to staff. Strong line activated, pt secured in room until security arrived. MD at the bedside IM Haldol, Ativan, and Benadryl ordered and given. Pt placed in 4 point violent restraints.

## 2023-01-26 NOTE — ED NOTES
Patient identifiers verified and correct for Adriano Jian  C/C: Pt has no complaints at this time. States he does not need to be here  APPEARANCE: awake and alert in no acute distress.  SKIN: warm, dry and intact. No breakdown or bruising.  MUSCULOSKELETAL: Patient moving all extremities spontaneously, no obvious swelling or deformities noted. Ambulates independently.  RESPIRATORY: Denies shortness of breath. Respirations unlabored.   CARDIAC: Denies CP, 2+ distal pulses; no peripheral edema  ABDOMEN: soft, non-tender, and non-distended, Denies N/V  : voids spontaneously, denies difficulty  Neurologic: AAO x 4; follows commands equal strength in all extremities; denies numbness/tingling. Denies dizziness

## 2023-01-26 NOTE — ED NOTES
Pt once again asked to provide a urine sample. Pt states he will but is unable to void at this time. Pt provided with water and offered juice, lunch tray at the bedside.

## 2023-01-26 NOTE — ED NOTES
Pt changed into a hospital gown, checked for contraband and his belongings secured. Pt did cooperate with the labs and covid swab. Pt given water and agrees to provide a urine sample asap. Pt is guarded, he denies having any AVH's or anxiety. Pt minimizing the events that led to him coming to the hospital. Pt remains on DVC, he is waiting for a psych consult.

## 2023-01-26 NOTE — ED TRIAGE NOTES
"Patient arrived to the ED by personal vehicle with complaints of anxiety (Denies SI/HI. Family member states [he is suffering from a "mental health crisis".] Patient presents with tactile hallucinations (itchy), and reports feeling like people are out to get him.   "

## 2023-01-26 NOTE — ED NOTES
Provided pt with turkey sandwich and pitcher of water. Out of restraints, being cooperative with staff at the moment

## 2023-01-26 NOTE — ED PROVIDER NOTES
Encounter Date: 1/26/2023       History     Chief Complaint   Patient presents with    Psychiatric Evaluation     Not taking meds     52-year-old male .  With history of diabetes, hypertension, brief reactive psychosis, schizophrenia  presents with bizarre behavior, hallucinations and delusions, insomnia, aggressive behavior in the setting of medication nonadherence.  Patient was seen yesterday for anxiety, his symptoms improved with lorazepam and he is subsequently discharged these not want to stay for further workup.  Patient's family called EMS in police today due to the symptoms listed above.  They believe the patient is not taking his diabetes medication.  He was aggressive and noncompliant with EMS but was able to reach direct without medication.  He agrees this time to stay for medical workup in the emergency department as well as discussion with Psychiatry.  Chart review shows the patient had an identical episode in May 2018.  He had a 10 day inpatient psychiatric stay.  Was prescribed Latuda but he denies taking any psychiatric medications.    Review of patient's allergies indicates:  No Known Allergies  Past Medical History:   Diagnosis Date    Brief reactive psychosis     Diabetes mellitus     Hypertension      No past surgical history on file.  No family history on file.  Social History     Tobacco Use    Smoking status: Former     Types: Cigarettes     Quit date: 7/20/2012     Years since quitting: 10.5   Substance Use Topics    Alcohol use: Yes     Comment: once every other month    Drug use: No     Review of Systems    Physical Exam     Initial Vitals   BP Pulse Resp Temp SpO2   01/26/23 1154 01/26/23 1154 01/26/23 1154 01/26/23 1246 01/26/23 1154   130/70 88 20 98.1 °F (36.7 °C) 98 %      MAP       --                Physical Exam    Constitutional:   obese   HENT:   Head: Normocephalic and atraumatic.   Eyes: Conjunctivae and EOM are normal. Pupils are equal, round, and reactive to light.   Neck:  Neck supple.   Normal range of motion.  Pulmonary/Chest: No respiratory distress.   Musculoskeletal:      Cervical back: Normal range of motion and neck supple.     Neurological: He is alert and oriented to person, place, and time.   Skin: Skin is warm and dry. Capillary refill takes less than 2 seconds.   Psychiatric:   Agitated but redirectable   Poor insight   Abnormal thought content   No suicidal ideation   No homicidal ideation         ED Course   Procedures  Labs Reviewed   CBC W/ AUTO DIFFERENTIAL - Abnormal; Notable for the following components:       Result Value    Hemoglobin 13.4 (*)     MCV 77 (*)     MCH 22.5 (*)     MCHC 29.3 (*)     RDW 17.2 (*)     Immature Grans (Abs) 0.05 (*)     Gran % 75.1 (*)     Lymph % 17.0 (*)     All other components within normal limits   COMPREHENSIVE METABOLIC PANEL - Abnormal; Notable for the following components:    Glucose 112 (*)     Total Protein 9.8 (*)     All other components within normal limits   URINALYSIS, REFLEX TO URINE CULTURE - Abnormal; Notable for the following components:    Protein, UA Trace (*)     Ketones, UA 1+ (*)     Leukocytes, UA 1+ (*)     All other components within normal limits    Narrative:     Specimen Source->Urine   ACETAMINOPHEN LEVEL - Abnormal; Notable for the following components:    Acetaminophen (Tylenol), Serum <3.0 (*)     All other components within normal limits   URINALYSIS MICROSCOPIC - Abnormal; Notable for the following components:    RBC, UA 10 (*)     WBC, UA 20 (*)     All other components within normal limits    Narrative:     Specimen Source->Urine   CULTURE, URINE   TSH   DRUG SCREEN PANEL, URINE EMERGENCY    Narrative:     Specimen Source->Urine   ALCOHOL,MEDICAL (ETHANOL)   SARS-COV-2 RNA AMPLIFICATION, QUAL          Imaging Results    None          Medications   LORazepam tablet 2 mg (has no administration in time range)   diphenhydrAMINE injection 50 mg (50 mg Intramuscular Given 1/26/23 9526)   haloperidol  lactate injection 5 mg (5 mg Intramuscular Given 1/26/23 1445)   LORazepam injection 2 mg (2 mg Intramuscular Given 1/26/23 1445)     Medical Decision Making:   History:   I obtained history from: someone other than patient and EMS provider.       <> Summary of History: Patient agitated and aggressive with family members   Not taking medication  Old Medical Records: I decided to obtain old medical records.  Old Records Summarized: records from previous admission(s).       <> Summary of Records: Patient admitted for psychosis secondary to medication nonadherence in May of 2018  Initial Assessment:   This patient aggressive and agitated requiring restraints and sedation.  He improved after haloperidol and lorazepam.  Refuses to participate in psychiatric interview.  He demonstrates abnormal thought process and thought content.  He has paranoid delusions.  He has visual hallucinations.  He has poor insight and judgment.  He is medically clear for psychiatric admission after testing and serial evaluations in the emergency department.    See ED course for additional HPI, ROS, PE, lab, imaging, consultant discussion, procedure interpretation, and Medical Decision Making.    Independently Interpreted Test(s):   I have ordered and independently interpreted X-rays - see prior notes.  I have ordered and independently interpreted EKG Reading(s) - see prior notes  Clinical Tests:   Lab Tests: Ordered and Reviewed  Radiological Study: Ordered and Reviewed  Medical Tests: Ordered and Reviewed  Other:   I have discussed this case with another health care provider.       <> Summary of the Discussion: Discussed with psychiatry on-call.  Patient refuses to participate in evaluation          Attending Attestation:         Attending Critical Care:   Critical Care Times:   Direct Patient Care (initial evaluation, reassessments, and time considering the case)................................................................10 minutes.    Additional History from reviewing old medical records or taking additional history from the family, EMS, PCP, etc.......................5 minutes.   Ordering, Reviewing, and Interpreting Diagnostic Studies...............................................................................................................5 minutes.   Documentation..................................................................................................................................................................................5 minutes.   Consultation with other Physicians. .................................................................................................................................................10 minutes.   ==============================================================  Total Critical Care Time - exclusive of procedural time: 35 minutes.  ==============================================================  Critical care reasons: Agitation and psychosis.   Critical care was time spent personally by me on the following activities: obtaining history from patient or relative, examination of patient, review of old charts, ordering lab, x-rays, and/or EKG, development of treatment plan with patient or relative, ordering and performing treatments and interventions, evaluation of patient's response to treatment, discussion with consultants and re-evaluation of patient's conition.   Critical Care Condition: potentially life-threatening         ED Course as of 01/26/23 2028   Thu Jan 26, 2023   1241 Psychiatry consulted.  They will evaluate the patient at bedside. [DS]   1329 Patient refusing to participate and psychiatric review.  Continue with pec plan. [DS]   1444 Medical screening unremarkable.  Patient refusing to participate in care.  Safe patient subsequently became agitated.  He disrobed.  He began threatening nursing and security personnel.  He began punching the glass window of his room and punching a wall.   Given the patient's acute danger to himself and others, was placed in locking restraints.  Chart review shows the patient responded very well to the combination of Haldol, Benadryl, and lorazepam during his last psychiatric stay.  Therefore, I provided same.  Will reassess after 15 minutes to see if her strange can safely be removed [DS]   1617 Patient resting comfortably.  He was sleeping but his rouses easily; his restraints have been discontinued. [DS]   2028 Patient continues to rest comfortably with reassuring vital signs.  Previously seen hypertension has resolved [DS]      ED Course User Index  [DS] Giuliano Mendoza MD         Medically cleared for psychiatry placement: 1/26/2023  1:31 PM         Clinical Impression:   Final diagnoses:  [F23] Acute psychosis (Primary)        ED Disposition Condition    Transfer to Psych Facility Stable          ED Prescriptions    None       Follow-up Information    None          Giuliano Mendoza MD  01/26/23 2588       Giuliano Mendoza MD  01/26/23 2029

## 2023-01-26 NOTE — ED NOTES
After speaking with the patient and the patient's family member PEC and [employer] was rescinded by MD. Patient, Charge nurse, security, and family member updated.

## 2023-01-26 NOTE — ED NOTES
Pt sitting up eating a sandwich and drinking water. Pt agrees to remain calm and cooperative. Pt restraints removed at 1540. Pt free from injury.

## 2023-01-26 NOTE — ED PROVIDER NOTES
"Encounter Date: 1/25/2023       History     Chief Complaint   Patient presents with    Anxiety     Denies SI/HI. Family states he is going through a "mental crisis". Tactile hallucinations (itchy), feels like people are out to get him.      53 yo M with pmhx DM, HTN, schizophrenia vs brief reactive psychosis, obesity presents with a chief complaint of anxiety.  Patient is accompanied by his uncle who is "like a brother to him".  They note that patient has been suffering from increased anxiety for the past few days since Saturday  He reports tactile hallucinations and feeling "itchy".  He notes that he has been experiencing hallucinations that the TV and radio was talking to him.  They are not commanding him to do anything.  He denies any SI, HI.  No visual hallucinations.  No drug or alcohol use.  The patient and his uncle note that in the past when the symptoms were present, they would improve when out of medication prescribed by his psychiatrist.  He follows with Dr. Duncan at [facility].  He notes that his symptoms have previously been controlled on psychiatric medication but patient's stop taking them as he did not like the way they made him feel drowsy.  He reports feeling safe at home and does not want to go to psychiatric facility.  His uncle notes that he does not feel the patient is severe enough to require hospitalization but wanted a medication to help calm him down.  They feel they can contact the psychiatrist tomorrow.  No somatic complaints.    Review of patient's allergies indicates:  No Known Allergies  Past Medical History:   Diagnosis Date    Brief reactive psychosis     Diabetes mellitus     Hypertension      History reviewed. No pertinent surgical history.  History reviewed. No pertinent family history.  Social History     Tobacco Use    Smoking status: Former     Types: Cigarettes     Quit date: 7/20/2012     Years since quitting: 10.5   Substance Use Topics    Alcohol use: Yes     Comment: " once every other month    Drug use: No     Review of Systems    Physical Exam     Initial Vitals [01/25/23 1745]   BP Pulse Resp Temp SpO2   (!) 171/118 100 19 98.9 °F (37.2 °C) 97 %      MAP       --         Physical Exam    Nursing note and vitals reviewed.  Constitutional: He appears well-developed and well-nourished. He is not diaphoretic. No distress.   Obese   HENT:   Head: Normocephalic and atraumatic.   Eyes: No scleral icterus.   Cardiovascular:  Normal rate.           Pulmonary/Chest: No stridor. No respiratory distress.   Abdominal: He exhibits no distension.   Musculoskeletal:         General: Normal range of motion.     Neurological: He is alert and oriented to person, place, and time.   Skin: Skin is warm.   Psychiatric: He has a normal mood and affect. His speech is normal and behavior is normal. He is not actively hallucinating. Thought content is not paranoid and not delusional. Cognition and memory are not impaired. He does not express inappropriate judgment. He expresses no homicidal and no suicidal ideation. He expresses no homicidal plans. He is attentive.       ED Course   Procedures  Labs Reviewed   HIV 1 / 2 ANTIBODY   HEPATITIS C ANTIBODY          Imaging Results    None          Medications   LORazepam tablet 1 mg (1 mg Oral Given 1/25/23 1905)     Medical Decision Making:   History:   Old Medical Records: I decided to obtain old medical records.  Initial Assessment:   51 yo M with pmhx DM, HTN, schizophrenia vs brief reactive psychosis, obesity presents with a chief complaint of anxiety.   Differential Diagnosis:   Panic attack, anxiety, grave disability, hallucinations  ED Management:  Given patient's history of schizophrenia verses brief reactive psychosis, I did consider that patient is having a psychotic flare.  However, on discussion with him, he appears linear and recognizes that the messages he is getting from the radio and TV are hallucinations.  He denies that they are commanding  him to do thing.  He does not want to be admitted to a psychiatric facility.  He reports that he is able to get follow-up with his psychiatrist.  I spoke with the patient's uncle who notes that he can stay with the patient and care for him on keep a close eye on him until he can see the psychiatrist.  I explained that I do not expect psychosis to improve with an antianxiety medication once in an ER.  I explained that these symptoms can take time to improve with medication titration while inpatient.  Despite that, they both do not feel he warrants emergent hospitalization at this time.  Given reassuring collateral, I do not feel patient is gravely disabled to require emergent pec against his wishes.  Will administer oral Ativan and reassess.    Reassessment:  Patient reports feeling comfortable and wishes to leave.  Again, he was offered psychiatric evaluation but he declined.  He does not wish to pursue inpatient treatment at this time either.  I do not feel his presentation warrants emergent restrained, sedation, and psychiatric evaluation.  This was supported by the patient's uncle.  Patient was explained that he may return to the ER at any point to continue his evaluation.                        Clinical Impression:   Final diagnoses:  [F41.9] Anxiety (Primary)  [F99] Psychiatric disorder        ED Disposition Condition    Discharge Stable          ED Prescriptions    None       Follow-up Information       Follow up With Specialties Details Why Contact Info    [facility] - Emergency Dept Emergency Medicine  As needed, If symptoms worsen [id number]  [facility] [id number]  [phone]             Vishal Cornejo MD  01/25/23 0715

## 2023-01-27 LAB — BACTERIA UR CULT: NO GROWTH

## 2023-01-27 NOTE — ED NOTES
Assumed care of patient at this time. Patient in bed, remains in provided hospital gown due to patient size. No signs of distress noted. Sitter remains at bedside in direct visual contact, charting per protocol every 15 minutes. No equipment or belongings are in the patients room. Will continue to monitor.

## 2023-01-27 NOTE — ED NOTES
Sweetie on site for transfer but states has to notify his supervisor as patient may not be able to be transferred on the stretcher.

## 2023-01-27 NOTE — ED NOTES
The patient is escorted to [facility] ambulance by [facility] &

## 2023-01-27 NOTE — ED NOTES
Provided oral hygiene supplies as patient requested to brush his teeth. Dinner tray removed w/ 90% of meal consumed. He is void of any complaints. DVC maintained for safety.

## 2023-01-27 NOTE — ED NOTES
Recv'd lying in bed appears asleep, rise/fall of chest noted. Shortly after noted sitting up in bed opening dinner tray. He os attired in a [place] hospital provided gown as patient's weight exceeds the comfort of the hospital provided scrubs. He is guarded, non engaging @ this time. Affect is blunted. He is maintained on DVC for safety. Awaiting transfer.

## 2023-03-10 ENCOUNTER — HOSPITAL ENCOUNTER (EMERGENCY)
Facility: HOSPITAL | Age: 53
Discharge: HOME | End: 2023-03-10
Attending: EMERGENCY MEDICINE
Payer: MEDICAID

## 2023-03-10 VITALS
TEMPERATURE: 99 F | WEIGHT: 315 LBS | RESPIRATION RATE: 18 BRPM | BODY MASS INDEX: 45.1 KG/M2 | HEIGHT: 70 IN | HEART RATE: 75 BPM | DIASTOLIC BLOOD PRESSURE: 78 MMHG | OXYGEN SATURATION: 98 % | SYSTOLIC BLOOD PRESSURE: 144 MMHG

## 2023-03-10 DIAGNOSIS — M54.9 BACK PAIN: ICD-10-CM

## 2023-03-10 DIAGNOSIS — Z53.21 ELOPED FROM EMERGENCY DEPARTMENT: Primary | ICD-10-CM

## 2023-03-10 PROCEDURE — 99284 EMERGENCY DEPT VISIT MOD MDM: CPT | Mod: ,,, | Performed by: PHYSICIAN ASSISTANT

## 2023-03-10 PROCEDURE — 99281 EMR DPT VST MAYX REQ PHY/QHP: CPT

## 2023-03-10 PROCEDURE — 99284 PR EMERGENCY DEPT VISIT,LEVEL IV: ICD-10-PCS | Mod: ,,, | Performed by: PHYSICIAN ASSISTANT

## 2023-03-10 RX ORDER — KETOROLAC TROMETHAMINE 30 MG/ML
30 INJECTION, SOLUTION INTRAMUSCULAR; INTRAVENOUS
Status: DISCONTINUED | OUTPATIENT
Start: 2023-03-10 | End: 2023-03-10 | Stop reason: HOSPADM

## 2023-03-10 NOTE — ED PROVIDER NOTES
Encounter Date: 3/10/2023       History     Chief Complaint   Patient presents with    Flank Pain     Pt reports left-sided flank and back pain x3 days. Denies fall or injury to area. Denies hx kidney stones.     The history is provided by the patient and medical records. No  was used.     Adriano Villar Jr. is a 52 y.o. male with medical history of DM, HTN, schizophrenia vs brief reactive psychosis, obesity presenting to the ED with the chief complaint of left flank pain.     Reports having L middle back pain for the past week. Experiences the pain whenever he lays down and has been having difficulty sleeping. Reports his son started sleeping in his bed and this maybe contributing to his pain. Denies radiation to his chest or abdomen. No chest pain, SOB, abdominal pain, vomiting, urinary or bowel movement changes. Reports chronic lower extremity paresthesias that he contributes to neuropathy. Denies new lower extremity numbness or weakness. No dysuria, hematuria, urinary frequency. Denies medications for pain today.     Review of patient's allergies indicates:  No Known Allergies  Past Medical History:   Diagnosis Date    Brief reactive psychosis     Diabetes mellitus     Hypertension      No past surgical history on file.  No family history on file.  Social History     Tobacco Use    Smoking status: Former     Types: Cigarettes     Quit date: 7/20/2012     Years since quitting: 10.6   Substance Use Topics    Alcohol use: Yes     Comment: once every other month    Drug use: No     Review of Systems   Musculoskeletal:  Positive for back pain.     Physical Exam     Initial Vitals [03/10/23 1544]   BP Pulse Resp Temp SpO2   (!) 144/78 75 18 98.9 °F (37.2 °C) 98 %      MAP       --         Physical Exam    Constitutional: He appears well-developed and well-nourished. He is not diaphoretic. He is easily aroused.   Obese male   HENT:   Head: Normocephalic and atraumatic.   Mouth/Throat: Oropharynx is  clear and moist. No oropharyngeal exudate.   Eyes: EOM and lids are normal. Pupils are equal, round, and reactive to light. No scleral icterus.   Neck: Phonation normal. Neck supple. No stridor present.   Normal range of motion.  Cardiovascular:  Normal rate and regular rhythm.           Pulmonary/Chest: Breath sounds normal. No stridor. No respiratory distress. He has no wheezes. He has no rales.   Abdominal: Abdomen is soft. He exhibits no distension. There is no abdominal tenderness. There is no rebound.   Musculoskeletal:         General: No tenderness or edema. Normal range of motion.      Cervical back: Normal range of motion and neck supple.      Comments: TTP L lateral mid back pain. No overlying rash or skin changes. Pain reproducible with torso twisting. No midline spinal tenderness. Full A/P ROM of extremities     Neurological: He is alert, oriented to person, place, and time and easily aroused. He has normal strength. No sensory deficit.   Skin: Skin is warm and dry. No rash noted. No erythema.   Psychiatric: He has a normal mood and affect. His speech is normal.       ED Course   Procedures  Labs Reviewed - No data to display         Imaging Results    None          Medications - No data to display  Medical Decision Making:   History:   Old Medical Records: I decided to obtain old medical records.  Old Records Summarized: records from clinic visits and records from previous admission(s).  Clinical Tests:   Lab Tests: Ordered and Reviewed  Radiological Study: Ordered and Reviewed     APC / Resident Notes:   52 y.o. male with medical history of DM, HTN, schizophrenia vs brief reactive psychosis, obesity presenting to the ED c/o 1 week of L middle back pain. DDx includes but not limited to muscular strain, rib injury, pneumonia, nephrolithiasis, pyelonephritis. Pain reproducible with palpation and position changes and lower suspicion for ACS, pulmonary embolism, AAA. Afebrile and non-toxic appearing. No  hypoxia or SOB.             4:21 PM - Ian Simons PA-C  Notified by nurse that patient eloped from the ED prior to his work-up being complete. Patient not present on my reassessment and unable to be found in the lobby.        Clinical Impression:   Final diagnoses:  [M54.9] Back pain  [Z53.21] Eloped from emergency department (Primary)        ED Disposition Condition    Eloped Stable                Ian Simons PA-C  03/10/23 7234

## 2023-03-12 ENCOUNTER — HOSPITAL ENCOUNTER (EMERGENCY)
Facility: HOSPITAL | Age: 53
Discharge: HOME | End: 2023-03-12
Attending: EMERGENCY MEDICINE
Payer: MEDICAID

## 2023-03-12 VITALS
HEART RATE: 69 BPM | TEMPERATURE: 98 F | OXYGEN SATURATION: 96 % | SYSTOLIC BLOOD PRESSURE: 148 MMHG | RESPIRATION RATE: 18 BRPM | DIASTOLIC BLOOD PRESSURE: 68 MMHG

## 2023-03-12 DIAGNOSIS — N12 PYELONEPHRITIS: ICD-10-CM

## 2023-03-12 DIAGNOSIS — R10.9 LEFT FLANK PAIN: Primary | ICD-10-CM

## 2023-03-12 LAB
ALBUMIN SERPL BCP-MCNC: 3.2 G/DL (ref 3.5–5.2)
ALP SERPL-CCNC: 60 U/L (ref 55–135)
ALT SERPL W/O P-5'-P-CCNC: 20 U/L (ref 10–44)
ANION GAP SERPL CALC-SCNC: 7 MMOL/L (ref 8–16)
AST SERPL-CCNC: 16 U/L (ref 10–40)
BACTERIA #/AREA URNS AUTO: ABNORMAL /HPF
BASOPHILS # BLD AUTO: 0.03 K/UL (ref 0–0.2)
BASOPHILS NFR BLD: 0.5 % (ref 0–1.9)
BILIRUB SERPL-MCNC: 0.2 MG/DL (ref 0.1–1)
BILIRUB UR QL STRIP: NEGATIVE
BUN SERPL-MCNC: 11 MG/DL (ref 6–20)
CALCIUM SERPL-MCNC: 9.3 MG/DL (ref 8.7–10.5)
CHLORIDE SERPL-SCNC: 102 MMOL/L (ref 95–110)
CLARITY UR REFRACT.AUTO: CLEAR
CO2 SERPL-SCNC: 28 MMOL/L (ref 23–29)
COLOR UR AUTO: YELLOW
CREAT SERPL-MCNC: 0.8 MG/DL (ref 0.5–1.4)
DIFFERENTIAL METHOD: ABNORMAL
EOSINOPHIL # BLD AUTO: 0.1 K/UL (ref 0–0.5)
EOSINOPHIL NFR BLD: 2.1 % (ref 0–8)
ERYTHROCYTE [DISTWIDTH] IN BLOOD BY AUTOMATED COUNT: 16.7 % (ref 11.5–14.5)
EST. GFR  (NO RACE VARIABLE): >60 ML/MIN/1.73 M^2
GLUCOSE SERPL-MCNC: 111 MG/DL (ref 70–110)
GLUCOSE UR QL STRIP: NEGATIVE
HCT VFR BLD AUTO: 38.1 % (ref 40–54)
HCV AB SERPL QL IA: NORMAL
HGB BLD-MCNC: 11.4 G/DL (ref 14–18)
HGB UR QL STRIP: NEGATIVE
HIV 1+2 AB+HIV1 P24 AG SERPL QL IA: NORMAL
IMM GRANULOCYTES # BLD AUTO: 0.03 K/UL (ref 0–0.04)
IMM GRANULOCYTES NFR BLD AUTO: 0.5 % (ref 0–0.5)
KETONES UR QL STRIP: NEGATIVE
LEUKOCYTE ESTERASE UR QL STRIP: ABNORMAL
LYMPHOCYTES # BLD AUTO: 1.9 K/UL (ref 1–4.8)
LYMPHOCYTES NFR BLD: 29.2 % (ref 18–48)
MCH RBC QN AUTO: 22.8 PG (ref 27–31)
MCHC RBC AUTO-ENTMCNC: 29.9 G/DL (ref 32–36)
MCV RBC AUTO: 76 FL (ref 82–98)
MICROSCOPIC COMMENT: ABNORMAL
MONOCYTES # BLD AUTO: 0.5 K/UL (ref 0.3–1)
MONOCYTES NFR BLD: 7.5 % (ref 4–15)
NEUTROPHILS # BLD AUTO: 3.9 K/UL (ref 1.8–7.7)
NEUTROPHILS NFR BLD: 60.2 % (ref 38–73)
NITRITE UR QL STRIP: NEGATIVE
NRBC BLD-RTO: 0 /100 WBC
PH UR STRIP: 6 [PH] (ref 5–8)
PLATELET # BLD AUTO: 246 K/UL (ref 150–450)
PMV BLD AUTO: 9.1 FL (ref 9.2–12.9)
POTASSIUM SERPL-SCNC: 4.1 MMOL/L (ref 3.5–5.1)
PROT SERPL-MCNC: 7.9 G/DL (ref 6–8.4)
PROT UR QL STRIP: NEGATIVE
RBC # BLD AUTO: 5 M/UL (ref 4.6–6.2)
RBC #/AREA URNS AUTO: 4 /HPF (ref 0–4)
SODIUM SERPL-SCNC: 137 MMOL/L (ref 136–145)
SP GR UR STRIP: 1.02 (ref 1–1.03)
SQUAMOUS #/AREA URNS AUTO: 1 /HPF
URN SPEC COLLECT METH UR: ABNORMAL
WBC # BLD AUTO: 6.53 K/UL (ref 3.9–12.7)
WBC #/AREA URNS AUTO: 23 /HPF (ref 0–5)

## 2023-03-12 PROCEDURE — 87389 HIV-1 AG W/HIV-1&-2 AB AG IA: CPT | Performed by: PHYSICIAN ASSISTANT

## 2023-03-12 PROCEDURE — 81001 URINALYSIS AUTO W/SCOPE: CPT | Performed by: PHYSICIAN ASSISTANT

## 2023-03-12 PROCEDURE — 86803 HEPATITIS C AB TEST: CPT | Performed by: PHYSICIAN ASSISTANT

## 2023-03-12 PROCEDURE — 63600175 PHARM REV CODE 636 W HCPCS: Performed by: PHYSICIAN ASSISTANT

## 2023-03-12 PROCEDURE — 99285 PR EMERGENCY DEPT VISIT,LEVEL V: ICD-10-PCS | Mod: CS,,, | Performed by: PHYSICIAN ASSISTANT

## 2023-03-12 PROCEDURE — 96365 THER/PROPH/DIAG IV INF INIT: CPT

## 2023-03-12 PROCEDURE — 87086 URINE CULTURE/COLONY COUNT: CPT | Performed by: PHYSICIAN ASSISTANT

## 2023-03-12 PROCEDURE — 99285 EMERGENCY DEPT VISIT HI MDM: CPT | Mod: 25

## 2023-03-12 PROCEDURE — 99285 EMERGENCY DEPT VISIT HI MDM: CPT | Mod: CS,,, | Performed by: PHYSICIAN ASSISTANT

## 2023-03-12 PROCEDURE — 85025 COMPLETE CBC W/AUTO DIFF WBC: CPT | Performed by: PHYSICIAN ASSISTANT

## 2023-03-12 PROCEDURE — 96375 TX/PRO/DX INJ NEW DRUG ADDON: CPT

## 2023-03-12 PROCEDURE — 25000003 PHARM REV CODE 250: Performed by: PHYSICIAN ASSISTANT

## 2023-03-12 PROCEDURE — 80053 COMPREHEN METABOLIC PANEL: CPT | Performed by: PHYSICIAN ASSISTANT

## 2023-03-12 RX ORDER — KETOROLAC TROMETHAMINE 30 MG/ML
10 INJECTION, SOLUTION INTRAMUSCULAR; INTRAVENOUS
Status: COMPLETED | OUTPATIENT
Start: 2023-03-12 | End: 2023-03-12

## 2023-03-12 RX ORDER — ACETAMINOPHEN 500 MG
1000 TABLET ORAL
Status: COMPLETED | OUTPATIENT
Start: 2023-03-12 | End: 2023-03-12

## 2023-03-12 RX ORDER — METHOCARBAMOL 500 MG/1
1000 TABLET, FILM COATED ORAL
Status: COMPLETED | OUTPATIENT
Start: 2023-03-12 | End: 2023-03-12

## 2023-03-12 RX ORDER — METHOCARBAMOL 500 MG/1
1000 TABLET, FILM COATED ORAL 3 TIMES DAILY
Qty: 30 TABLET | Refills: 0 | Status: SHIPPED | OUTPATIENT
Start: 2023-03-12 | End: 2023-03-17

## 2023-03-12 RX ORDER — NAPROXEN 500 MG/1
500 TABLET ORAL 2 TIMES DAILY WITH MEALS
Qty: 20 TABLET | Refills: 0 | Status: SHIPPED | OUTPATIENT
Start: 2023-03-12

## 2023-03-12 RX ORDER — CEFDINIR 300 MG/1
300 CAPSULE ORAL 2 TIMES DAILY
Qty: 20 CAPSULE | Refills: 0 | Status: SHIPPED | OUTPATIENT
Start: 2023-03-12 | End: 2023-03-22

## 2023-03-12 RX ADMIN — METHOCARBAMOL 1000 MG: 500 TABLET ORAL at 10:03

## 2023-03-12 RX ADMIN — KETOROLAC TROMETHAMINE 10 MG: 30 INJECTION, SOLUTION INTRAMUSCULAR; INTRAVENOUS at 09:03

## 2023-03-12 RX ADMIN — CEFTRIAXONE 1 G: 1 INJECTION, POWDER, FOR SOLUTION INTRAMUSCULAR; INTRAVENOUS at 09:03

## 2023-03-12 RX ADMIN — ACETAMINOPHEN 1000 MG: 500 TABLET ORAL at 10:03

## 2023-03-12 NOTE — ED NOTES
Pt's first and last name and birthday confirmed.   LOC: The patient is awake, alert and aware of environment with an appropriate affect, the patient is oriented x 3 and speaking appropriately.  APPEARANCE: Patient is uncomfortable, patient is clean and well groomed.  SKIN: The skin is warm and dry, patient has normal skin turgor and moist mucus membranes, skin intact, no breakdown or brusing noted.  MUSKULOSKELETAL: Patient moving all extremities well, no obvious swelling or deformities noted.  RESPIRATORY: Airway is open and patent, respirations are spontaneous, patient has a normal effort and rate.   ABDOMEN: Soft and non tender to palpation, no distention noted. Left flank pain.   NEURO: No neuro deficits, no drift noted, no facial droop noted. Speech is clear, pt ambulating with normal gait.

## 2023-03-12 NOTE — ED TRIAGE NOTES
Left flank pain x 4 days. Pt also reports that the urge to urinate will come on strong and he has urinated on himself on the way to the restroom.

## 2023-03-12 NOTE — ED PROVIDER NOTES
"Encounter Date: 3/12/2023       History     Chief Complaint   Patient presents with    Flank Pain     Started four days ago on left side. Experiencing urinary hesitancy.      52-year-old male with history of hypertension, diabetes presents to the ED complaining of left flank pain for the past 4 days.  He reports the pain is an intermittent 9/10 "pulling" worse with movement or lying on the left side.  He has taken ibuprofen with no relief of his symptoms.  No prior history of kidney stone.  He does report urinary urgency.  No recent antibiotic use.  He denies fever, chills, chest pain, shortness of breath, nausea, dysuria, falls, rash, headache.    The history is provided by the patient.   Review of patient's allergies indicates:  No Known Allergies  Past Medical History:   Diagnosis Date    Brief reactive psychosis     Diabetes mellitus     Hypertension      No past surgical history on file.  No family history on file.  Social History     Tobacco Use    Smoking status: Former     Types: Cigarettes     Quit date: 7/20/2012     Years since quitting: 10.6   Substance Use Topics    Alcohol use: Yes     Comment: once every other month    Drug use: No     Review of Systems   Constitutional:  Negative for chills and fever.   Respiratory:  Negative for shortness of breath.    Cardiovascular:  Negative for chest pain.   Gastrointestinal:  Negative for abdominal pain and nausea.   Genitourinary:  Positive for flank pain and urgency. Negative for dysuria, frequency and hematuria.   Musculoskeletal:  Positive for back pain.   Skin:  Negative for rash.   Neurological:  Negative for headaches.   Psychiatric/Behavioral:  Negative for confusion.      Physical Exam     Initial Vitals   BP Pulse Resp Temp SpO2   03/12/23 0841 03/12/23 0841 03/12/23 0843 03/12/23 0841 03/12/23 0841   (!) 183/95 77 16 97.9 °F (36.6 °C) 96 %      MAP       --                Physical Exam    Constitutional: He appears well-developed and well-nourished. " He is not diaphoretic. He is Obese . No distress.   HENT:   Head: Normocephalic and atraumatic.   Cardiovascular:  Normal rate, regular rhythm and normal heart sounds.     Exam reveals no gallop and no friction rub.       No murmur heard.  Pulmonary/Chest: Breath sounds normal. He has no wheezes. He has no rhonchi. He has no rales.   Abdominal: Abdomen is soft. Bowel sounds are normal. There is no abdominal tenderness.   No right CVA tenderness.  No left CVA tenderness. There is no rebound and no guarding.   Musculoskeletal:      Comments: L flank tenderness/muscular tenderness. No midline C, T or L spine tenderness.     Neurological: He is alert and oriented to person, place, and time.   Skin: No rash noted.   Psychiatric: He has a normal mood and affect.       ED Course   Procedures  Labs Reviewed   CBC W/ AUTO DIFFERENTIAL - Abnormal; Notable for the following components:       Result Value    Hemoglobin 11.4 (*)     Hematocrit 38.1 (*)     MCV 76 (*)     MCH 22.8 (*)     MCHC 29.9 (*)     RDW 16.7 (*)     MPV 9.1 (*)     All other components within normal limits    Narrative:     Release to patient->Immediate   COMPREHENSIVE METABOLIC PANEL - Abnormal; Notable for the following components:    Glucose 111 (*)     Albumin 3.2 (*)     Anion Gap 7 (*)     All other components within normal limits    Narrative:     Release to patient->Immediate   URINALYSIS, REFLEX TO URINE CULTURE - Abnormal; Notable for the following components:    Leukocytes, UA 3+ (*)     All other components within normal limits    Narrative:     Specimen Source->Urine   URINALYSIS MICROSCOPIC - Abnormal; Notable for the following components:    WBC, UA 23 (*)     All other components within normal limits    Narrative:     Specimen Source->Urine   CULTURE, URINE   HIV 1 / 2 ANTIBODY    Narrative:     Release to patient->Immediate   HEPATITIS C ANTIBODY    Narrative:     Release to patient->Immediate          Imaging Results              CT Renal  Stone Study ABD Pelvis WO (Final result)  Result time 03/12/23 10:10:33      Final result by Gagandeep Sharif MD (03/12/23 10:10:33)                   Impression:      1. No acute findings in the abdomen or pelvis by unenhanced CT technique.  No evidence of radiopaque urolithiasis or obstructive uropathy, as questioned.  2. Additional details, as provided in the body of report.      Electronically signed by: Gagandeep Sharif  Date:    03/12/2023  Time:    10:10               Narrative:    EXAMINATION:  CT RENAL STONE STUDY ABD PELVIS WO    CLINICAL HISTORY:  Flank pain, kidney stone suspected;    TECHNIQUE:  Low dose axial images, sagittal and coronal reformations were obtained from the lung bases to the pubic symphysis.  Contrast was not administered.    COMPARISON:  CT of the abdomen and pelvis performed 04/03/2012.    FINDINGS:  Evaluation of the solid organs is limited due to lack of intravenous contrast.    Lower chest: Atelectasis and scar suggested the left lower lobe.    URINARY COLLECTING SYSTEM: No calculi in the kidneys, ureters, or urinary bladder. No hydronephrosis or ureterectasis.    Liver: Normal contour.  Borderline hepatic steatosis.    Gallbladder and bile ducts: Unremarkable.    Pancreas: Normal contour.    Spleen: Normal contour.    Adrenals: Normal contour.    Lymph nodes: No abdominal or pelvic lymphadenopathy.    Bowel and mesentery: Unremarkable.    Abdominal aorta: Unremarkable.    Inferior vena cava: Unremarkable.    Free fluid or free air: None.    Pelvis: Unremarkable.    Body wall: Fat containing umbilical hernia.  Fat containing left inguinal hernia.    Bones: No definite acute change.  Degenerative findings are noted involving the spine and hip joints.                                       Medications   ketorolac injection 9.999 mg (9.999 mg Intravenous Given 3/12/23 9963)   sodium chloride 0.9% bolus 1,000 mL 1,000 mL (0 mLs Intravenous Stopped 3/12/23 1050)   cefTRIAXone (ROCEPHIN)  1 g in dextrose 5 % in water (D5W) 5 % 50 mL IVPB (MB+) (0 g Intravenous Stopped 3/12/23 1030)   methocarbamoL tablet 1,000 mg (1,000 mg Oral Given 3/12/23 1025)   acetaminophen tablet 1,000 mg (1,000 mg Oral Given 3/12/23 1025)     Medical Decision Making:   History:   Old Medical Records: I decided to obtain old medical records.  Old Records Summarized: records from clinic visits and records from previous admission(s).       <> Summary of Records: Seen in the ED 3/10 complaining of left flank pain.  Eloped.  Clinical Tests:   Lab Tests: Ordered and Reviewed  Radiological Study: Reviewed and Ordered     APC / Resident Notes:   52-year-old male with history of hypertension, diabetes presents to the ED complaining of left flank pain for the past 4 days.  Obese.  Hypertensive.  Abdomen is soft, nontender.  There is no CVA tenderness.  There is no midline C, T, or L-spine tenderness.  There is some muscular tenderness noted to the left thoracic back.  No rashes.  Differential diagnosis includes but isn't limited to kidney stone, UTI, pyelonephritis, shingles prodrome, musculoskeletal pain.  He has a normal abdominal exam, denies any abdominal pain or chest pain, have considered but do not suspect AAA.  Will get labs, CT renal stone for further evaluation.    No leukocytosis.  UA consistent with UTI.  Urine culture pending. IV rocephin ordered. CMP unremarkable.     CT renal stone with no stones noted.     Will treat for pyelonephritis. I do not feel that he needs any further labs or imaging at this time. Stable for discharge.    He was discharged with prescriptions for omnicef, naproxen, robaxin.  He will follow up with his PCP.  Strict ED return precautions given.  All of the patient's questions were answered.  I reviewed the patient's chart, labs, and imaging.                            Clinical Impression:   Final diagnoses:  [R10.9] Left flank pain (Primary)  [N12] Pyelonephritis        ED Disposition Condition     Discharge Stable          ED Prescriptions       Medication Sig Dispense Start Date End Date Auth. Provider    cefdinir (OMNICEF) 300 MG capsule Take 1 capsule (300 mg total) by mouth 2 (two) times daily. for 10 days 20 capsule 3/12/2023 3/22/2023 Ni Milligan PA-C    naproxen (NAPROSYN) 500 MG tablet Take 1 tablet (500 mg total) by mouth 2 (two) times daily with meals. 20 tablet 3/12/2023 -- Ni Milligan PA-C    methocarbamoL (ROBAXIN) 500 MG Tab Take 2 tablets (1,000 mg total) by mouth 3 (three) times daily. for 5 days 30 tablet 3/12/2023 3/17/2023 Ni Milligan PA-C          Follow-up Information       Follow up With Specialties Details Why Contact Info Additional Information    [facility]   [address]  [facility] [id number] [phone] [facility] Please park in surface lot and check in at central registration desk             Ni Milligan PA-C  03/12/23 2770

## 2023-03-13 LAB
BACTERIA UR CULT: NORMAL
BACTERIA UR CULT: NORMAL

## 2023-03-14 ENCOUNTER — PATIENT OUTREACH (OUTPATIENT)
Dept: EMERGENCY MEDICINE | Facility: HOSPITAL | Age: 53
End: 2023-03-14
Payer: MEDICAID

## 2023-03-14 NOTE — PROGRESS NOTES
Lauryn King LPN  ED Navigator  Emergency Department    [place] [facility] [place]  Role: Community Health Worker    Date: 03/14/2023  Patient Name: Adriano Villar Jr.  MRN: [id number]  PCP: Primary Doctor No    Assessment:     Adriano Villar Jr. is a 52 y.o. male who has presented to ED for flank pain. Patient has visited the ED 4 times in the past 3 months. Patient did not contact PCP.     ED Navigator Initial Assessment    ED Navigator Enrollment Documentation  Consent to Services  Does patient consent to completing the assessment?: Yes  Contact  Method of Initial Contact: Phone  Transportation  Does the patient have issues with Transportation?: No  Does the patient have transportation to and from healthcare appointments?: Yes  Insurance Coverage  Do you have coverage/adequate coverage?: Yes  Type/kind of coverage: Medicaid/Healthy Blue ([employer])  Is patient able to afford co-pays/deductibles?: Yes  Is patient able to afford HME or supplies?: Yes  Does patient have an established [facility] PCP?: Yes  Able to access?: Yes  Does the patient have a lack of adequate coverage?: No  Specialist Appointment  Did the patient come to the ED to see a specialist?: No  Does the patient have a pending specialist referral?: No  Does the patient have a specialist appointment made?: No  PCP Follow Up Appointment  Has the patient had an appointment with a primary care provider in the past year?: Yes  Approximate date: 2/28/23  Provider: Outside Doctor  Does the patient have a follow up appontment with a PCP?: No  When was the last time you saw your PCP?: 2/28/23  Why does the patient not have a follow up scheduled?: Other (see comments) (Comment: Pt will schedule as needed with PCP.)  Medications  Is patient able to afford medication?: Yes  Is patient unable to get medication due to lack of transportation?: No  Psychological  Does the patient have psycho-social concerns?: No  Food  Does the patient have concerns about food?:  Yes  What concerns does the patient have regarding food?: Lack of food  Communication/Education  Does the patient have limited English proficiency/English not primary language?: No  Does patient have low literacy and/or low health literacy?: No  Does patient have concerns with care?: No  Does patient have dissatisfaction with care?: No  Other Financial Concerns  Does the patient have immediate financial distress?: Yes  Does the patient have general financial concerns?: Yes  Other Social Barriers/Concerns  Does the patient have any additional barriers or concerns?: Unable to afford utilities  Primary Barrier  Barriers identified: Financial barrier (health insurance, employment, etc.)  Root Cause of ED Utilization: Housing Instability  Plan to address Housing Instability: Patient was provided with income-based housing options  Next steps: Provided Education  Was education/educational materials provided surrounding PCP services/creating a medical home?: Yes Was education verbal or written?: Written     Was education/educational materials provided surrounding low cost, healthy foods?: Yes Was education verbal or written?: Written     Was education/educational materials provided surrounding other items? If so, use comment to explain.: Yes Was education verbal or written?: Written   Plan: Provided information for [facility] 24/7 Nurse triage line, [phone] or [phone] ([phone])  Expected Date of Follow Up 1: 4/11/23         Social History     Socioeconomic History    Marital status: Single   Tobacco Use    Smoking status: Former     Types: Cigarettes     Quit date: 7/20/2012     Years since quitting: 10.6   Substance and Sexual Activity    Alcohol use: Yes     Comment: once every other month    Drug use: No     Social Determinants of Health     Financial Resource Strain: Medium Risk    Difficulty of Paying Living Expenses: Somewhat hard   Food Insecurity: Food Insecurity Present    Worried About Running  Out of Food in the Last Year: Sometimes true    Ran Out of Food in the Last Year: Sometimes true   Transportation Needs: No Transportation Needs    Lack of Transportation (Medical): No    Lack of Transportation (Non-Medical): No   Physical Activity: Inactive    Days of Exercise per Week: 0 days    Minutes of Exercise per Session: 0 min   Stress: Stress Concern Present    Feeling of Stress : To some extent   Social Connections: Socially Isolated    Frequency of Communication with Friends and Family: More than three times a week    Frequency of Social Gatherings with Friends and Family: More than three times a week    Attends Rastafarian Services: Never    Active Member of Clubs or Organizations: No    Attends Club or Organization Meetings: Never    Marital Status: Never    Housing Stability: High Risk    Unable to Pay for Housing in the Last Year: Yes    Unstable Housing in the Last Year: No       Plan:   Call placed to Pt in regards to recent ER visit for flank pain.  Pt states he has gotten all of his meds and taken them as needed. He states the pain has eased up. Pt would like information on utility assistance. Patient was given utility assistance resources for their area.  Patient was given education on Rent/Mortgage payment assistance for their region.  Patient was given education on Stress and anxiety relief.  Patient was given education on (The Right Care at the Right Level information, [facility] information, and Heart healthy diet tips). Pt advised that he may reach out with any questions or concerns. Pt verbalized understanding.   Lauryn King    Food Insecurity: resources provided    Appointment made with: Primary Doctor Dunia

## 2023-04-11 ENCOUNTER — PATIENT OUTREACH (OUTPATIENT)
Dept: EMERGENCY MEDICINE | Facility: HOSPITAL | Age: 53
End: 2023-04-11
Payer: MEDICAID

## 2023-04-11 NOTE — PROGRESS NOTES
Call placed to f/u from initial enrollment. Pt states that he is having trouble getting his Invega injection. Call placed to Dr. Duncan's office in regards to getting refills. ED Navigator informed that the Pt has been missing appt's. F/u scheduled for 3pm on 4-19-23. Pt informed and verbalized understanding.

## 2023-05-16 ENCOUNTER — PATIENT OUTREACH (OUTPATIENT)
Dept: EMERGENCY MEDICINE | Facility: HOSPITAL | Age: 53
End: 2023-05-16
Payer: MEDICAID

## 2023-05-16 NOTE — PROGRESS NOTES
Call placed to Pt to f/u from last encounter. Pt states all is well and denies any needs at this time. Next f/u scheduled for 6-27-23.

## 2023-06-27 ENCOUNTER — PATIENT OUTREACH (OUTPATIENT)
Dept: EMERGENCY MEDICINE | Facility: HOSPITAL | Age: 53
End: 2023-06-27
Payer: MEDICAID

## 2023-06-27 NOTE — PROGRESS NOTES
Call placed to f/u from last encounter and most recent ER visit. Pt states he is doing ok but is currently in need of rental assistance. Patient was given education on Rent/Mortgage payment assistance for their region. Email sent to Pt's son at his request to [email or web address]. Pt and son verbalized understanding and will call back if they need further assistance.

## 2023-07-26 ENCOUNTER — PATIENT OUTREACH (OUTPATIENT)
Dept: EMERGENCY MEDICINE | Facility: HOSPITAL | Age: 53
End: 2023-07-26
Payer: MEDICAID

## 2024-07-05 ENCOUNTER — HOSPITAL ENCOUNTER (EMERGENCY)
Facility: HOSPITAL | Age: 54
Discharge: HOME | End: 2024-07-05
Attending: EMERGENCY MEDICINE
Payer: MEDICAID

## 2024-07-05 VITALS
HEART RATE: 73 BPM | RESPIRATION RATE: 18 BRPM | OXYGEN SATURATION: 98 % | SYSTOLIC BLOOD PRESSURE: 140 MMHG | TEMPERATURE: 98 F | DIASTOLIC BLOOD PRESSURE: 94 MMHG

## 2024-07-05 DIAGNOSIS — J02.0 STREP PHARYNGITIS: Primary | ICD-10-CM

## 2024-07-05 LAB
GROUP A STREP, MOLECULAR: POSITIVE
SARS-COV-2 RDRP RESP QL NAA+PROBE: NEGATIVE

## 2024-07-05 PROCEDURE — 99284 EMERGENCY DEPT VISIT MOD MDM: CPT | Mod: 25

## 2024-07-05 PROCEDURE — U0002 COVID-19 LAB TEST NON-CDC: HCPCS | Performed by: EMERGENCY MEDICINE

## 2024-07-05 PROCEDURE — 25000003 PHARM REV CODE 250: Performed by: EMERGENCY MEDICINE

## 2024-07-05 PROCEDURE — 63600175 PHARM REV CODE 636 W HCPCS: Performed by: EMERGENCY MEDICINE

## 2024-07-05 PROCEDURE — 87651 STREP A DNA AMP PROBE: CPT | Performed by: EMERGENCY MEDICINE

## 2024-07-05 PROCEDURE — 96372 THER/PROPH/DIAG INJ SC/IM: CPT | Performed by: EMERGENCY MEDICINE

## 2024-07-05 RX ORDER — AMOXICILLIN 500 MG/1
1000 CAPSULE ORAL DAILY
Qty: 20 CAPSULE | Refills: 0 | Status: SHIPPED | OUTPATIENT
Start: 2024-07-05 | End: 2024-07-15

## 2024-07-05 RX ORDER — KETOROLAC TROMETHAMINE 30 MG/ML
15 INJECTION, SOLUTION INTRAMUSCULAR; INTRAVENOUS
Status: COMPLETED | OUTPATIENT
Start: 2024-07-05 | End: 2024-07-05

## 2024-07-05 RX ORDER — AMOXICILLIN 500 MG/1
1000 CAPSULE ORAL
Status: COMPLETED | OUTPATIENT
Start: 2024-07-05 | End: 2024-07-05

## 2024-07-05 RX ORDER — ACETAMINOPHEN 500 MG
1000 TABLET ORAL
Status: COMPLETED | OUTPATIENT
Start: 2024-07-05 | End: 2024-07-05

## 2024-07-05 RX ADMIN — AMOXICILLIN 1000 MG: 500 CAPSULE ORAL at 11:07

## 2024-07-05 RX ADMIN — KETOROLAC TROMETHAMINE 15 MG: 30 INJECTION, SOLUTION INTRAMUSCULAR; INTRAVENOUS at 10:07

## 2024-07-05 RX ADMIN — ACETAMINOPHEN 1000 MG: 500 TABLET ORAL at 10:07

## 2024-07-05 NOTE — DISCHARGE INSTRUCTIONS
You have strep throat. I recommend you take Amoxicillin 1 gram daily for the next 10 days. Do not stop treatment early.    You can take Ibuprofen 600 mg every 6 hours and Tylenol 650 mg every 6-8 hours for pain.    Follow up with your primary doctor.    Seek immediate medical attention if you have difficulty breathing or tolerating your secretions/saliva/difficulty swallowing.

## 2024-07-05 NOTE — ED PROVIDER NOTES
Encounter Date: 7/5/2024       History     Chief Complaint   Patient presents with    Neck Pain     Pain radiates from the right side of his neck to his right eye.  PMH HTN, DM, Schizophrenic, Bipolar.     Pt is a 53 yo M with PMH of HTN, DM, former smoker who presents with pain to his right neck that shoots up into his right face and head.  He reports he woke up with it yesterday morning like this.  The pain is intermittent.  He denies vision changes, weakness, numbness.  He denies rash.  He denies ear pain.  Somewhat of a sore throat in the right side.  He notes pain with swallowing. He took nsaids yesterday for pain but it persisted today    The history is provided by the patient.     Review of patient's allergies indicates:  No Known Allergies  Past Medical History:   Diagnosis Date    Brief reactive psychosis     Diabetes mellitus     Hypertension      History reviewed. No pertinent surgical history.  No family history on file.      Physical Exam     Initial Vitals [07/05/24 0959]   BP Pulse Resp Temp SpO2   (!) 140/94 73 18 98.2 °F (36.8 °C) 98 %      MAP       --         Physical Exam    Nursing note and vitals reviewed.  Constitutional: He appears well-developed and well-nourished. He is not diaphoretic. No distress.   HENT:   Head: Atraumatic.   Right Ear: External ear normal.   Left Ear: External ear normal.   Nose: Nose normal.   Mouth/Throat: Oropharynx is clear and moist. No oropharyngeal exudate.   Eyes: Conjunctivae and EOM are normal.   Neck: Neck supple.   No occipital lymphadenopaty  No swelling to parotid glands bilaterall   Normal range of motion.  Cardiovascular:  Normal rate and regular rhythm.           Pulmonary/Chest: No respiratory distress.   Abdominal: Abdomen is soft.   Musculoskeletal:         General: Normal range of motion.      Cervical back: Normal range of motion and neck supple.     Lymphadenopathy:     He has no cervical adenopathy.   Neurological: He is alert and oriented to  person, place, and time. GCS score is 15. GCS eye subscore is 4. GCS verbal subscore is 5. GCS motor subscore is 6.   Skin: Skin is warm and dry. No rash noted.   Psychiatric: He has a normal mood and affect. His behavior is normal. Judgment and thought content normal.         ED Course   Procedures  Labs Reviewed   GROUP A STREP, MOLECULAR - Abnormal; Notable for the following components:       Result Value    Group A Strep, Molecular Positive (*)     All other components within normal limits   SARS-COV-2 RNA AMPLIFICATION, QUAL          Imaging Results    None          Medications   acetaminophen tablet 1,000 mg (1,000 mg Oral Given 7/5/24 1042)   ketorolac injection 15 mg (15 mg Intramuscular Given 7/5/24 1030)   amoxicillin capsule 1,000 mg (1,000 mg Oral Given 7/5/24 1125)     Medical Decision Making  DDX:  Strep pharyngitis, muscle strain, mononucleosis, parotitis, , lymphadenitis, PTA, RPA  Patient with an enlarged tonsil on the right but not significantly so I do not suspect a PTA  No lymphadenopathy and his symptoms have not been going on very long for this to be mono  Tolerating secretions well, no noisy breathing  Doubt RPA    Strep is positive we will treat with amoxicillin  Patient updated on results and is in agreement with plan  Discharged to home in stable condition, return to ED warnings given, follow up and patient care instructions given.        Risk  OTC drugs.  Prescription drug management.                                      Clinical Impression:  Final diagnoses:  [J02.0] Strep pharyngitis (Primary)          ED Disposition Condition    Discharge Stable          ED Prescriptions       Medication Sig Dispense Start Date End Date Auth. Provider    amoxicillin (AMOXIL) 500 MG capsule Take 2 capsules (1,000 mg total) by mouth once daily. for 10 days 20 capsule 7/5/2024 7/15/2024 Marisa Cox MD          Follow-up Information    None          Marisa Cox MD  07/06/24  1713

## 2024-07-05 NOTE — ED TRIAGE NOTES
Patient identifiers for Adriano Villar Jr. 54 y.o. male checked and correct.  Chief Complaint   Patient presents with    Neck Pain     Pain radiates from the right side of his neck to his right eye.  PMH HTN, DM, Schizophrenic, Bipolar.     Past Medical History:   Diagnosis Date    Brief reactive psychosis     Diabetes mellitus     Hypertension      Allergies reported: Review of patient's allergies indicates:  No Known Allergies      LOC: Patient is awake, alert, and aware of environment with an appropriate affect. Patient is oriented x 4 and speaking appropriately.  APPEARANCE: Patient resting comfortably and in no acute distress. Patient is clean and well groomed, patient's clothing is properly fastened.  HEENT: right side neck pain that radiates to right eye  SKIN: The skin is warm and dry. Patient has normal skin turgor and moist mucus membranes.   MUSKULOSKELETAL: Patient is moving all extremities well, no obvious deformities noted. Pulses intact.   RESPIRATORY: Airway is open and patent. Respirations are spontaneous and non-labored with normal effort and rate.  CARDIAC: Patient has a normal rate and rhythm. Normal sinus on cardiac monitor. No peripheral edema noted.   ABDOMEN: No distention noted. Soft and non-tender upon palpation.  NEUROLOGICAL: PERRL. Facial expression is symmetrical. Hand grasps are equal bilaterally. Normal sensation in all extremities when touched with finger.
